# Patient Record
Sex: FEMALE | Race: WHITE | NOT HISPANIC OR LATINO | Employment: FULL TIME | URBAN - NONMETROPOLITAN AREA
[De-identification: names, ages, dates, MRNs, and addresses within clinical notes are randomized per-mention and may not be internally consistent; named-entity substitution may affect disease eponyms.]

---

## 2019-07-07 ENCOUNTER — OCCUPATIONAL MEDICINE (OUTPATIENT)
Dept: URGENT CARE | Facility: PHYSICIAN GROUP | Age: 34
End: 2019-07-07
Payer: COMMERCIAL

## 2019-07-07 ENCOUNTER — APPOINTMENT (OUTPATIENT)
Dept: RADIOLOGY | Facility: IMAGING CENTER | Age: 34
End: 2019-07-07
Attending: PHYSICIAN ASSISTANT
Payer: COMMERCIAL

## 2019-07-07 VITALS
HEART RATE: 84 BPM | DIASTOLIC BLOOD PRESSURE: 84 MMHG | WEIGHT: 225 LBS | RESPIRATION RATE: 16 BRPM | TEMPERATURE: 98.6 F | SYSTOLIC BLOOD PRESSURE: 122 MMHG | OXYGEN SATURATION: 96 %

## 2019-07-07 DIAGNOSIS — S83.002A SUBLUXATION OF LEFT PATELLA, INITIAL ENCOUNTER: ICD-10-CM

## 2019-07-07 DIAGNOSIS — S83.92XA SPRAIN OF LEFT KNEE, UNSPECIFIED LIGAMENT, INITIAL ENCOUNTER: ICD-10-CM

## 2019-07-07 PROCEDURE — 99203 OFFICE O/P NEW LOW 30 MIN: CPT | Mod: 29 | Performed by: PHYSICIAN ASSISTANT

## 2019-07-07 PROCEDURE — 73562 X-RAY EXAM OF KNEE 3: CPT | Mod: TC,FY,LT,29 | Performed by: PHYSICIAN ASSISTANT

## 2019-07-07 NOTE — PROGRESS NOTES
Chief Complaint   Patient presents with   • Work-Related Injury     New  for L knee injury/ DOI: 7/5/19/ [facility]        HISTORY OF PRESENT ILLNESS: Patient is a 33 y.o. female who presents today for the following:    DOI: 7/5/19, around 0700  Patient was on a cart ladder, second step, stepping down, when left knee gave out and fell to the ground. Fell on her back, hitting her head. Denies LOC. Continues to have HA, mid/low back pain, left knee pain/tightness. Decreased ROM left knee. Worsening pain with ambulation. Tingling entire right leg only when supine with left knee elevated. OTC meds tried: ibuprofen, hasn't helped at all (took 800mg this morning, previously was taking it QID); ice, hasn't helped at all. Other places of employment: none. History left knee, back, HA issues: none.      Allergies:Ceclor [cefaclor] and Demerol    PMH: No pertinent past medical history to this problem  MEDS: Medications were reviewed in Epic  ALLERGIES: Allergies were reviewed in Epic  SOCHX: Works as a phoenix at [employer]   FH: No pertinent family history to this problem    Review of Systems:   Constitutional ROS: No unexpected change in weight, No weakness, No fatigue  Musculoskeletal/Extremities ROS: Left knee pain, mid back pain.  Skin/Integumentary ROS: No edema, No evidence of rash, No itching      Exam:  /84   Pulse 84   Temp 37 °C (98.6 °F) (Temporal)   Resp 16   Wt 102.1 kg (225 lb)   SpO2 96%   General: Well developed, well nourished. No distress.  Using a wheelchair.  HEENT: Head is grossly normal.  Pulmonary: Unlabored respiratory effort.   Back: No localized tenderness noted.  No vertebral tenderness noted.  Neurologic: Grossly nonfocal. No facial asymmetry noted.  Extremities: Decreased flexion of the left knee (flexion to approximately 100-120 degrees).  No ecchymosis or deformities noted.  Mild tenderness noted in both joint lines.  No joint laxity noted.  No obvious edema when compared to the right  knee but may be limited due to body habitus.  Left patella is mobile but perhaps more lateral than the right.  Skin: Warm, dry, good turgor. No rashes in visible areas.   Psych: Normal mood. Alert and oriented x3. Judgment and insight is normal.    Left knee x-ray, per radiology:  Impression       1.  Lateral subluxation of the patella.    2.  Small to moderate-sized left knee effusion. No lipohemarthrosis.    3.  No patellar fracture.    4.  No other knee fracture.     Assessment/Plan:  Recommend crutches at work (provided in the clinic).  Refer to D 39 for restrictions.  Discussed appropriate dosing of ibuprofen and acetaminophen as needed for pain.  Recommend Epson salt soaks, ice, and knee brace for compression.  Return to clinic 7/12/2019 for reevaluation, sooner for any significant changes in symptoms.  1. Sprain of left knee, unspecified ligament, initial encounter  DX-KNEE 3 VIEWS LEFT   2. Subluxation of left patella, initial encounter

## 2019-07-07 NOTE — LETTER
EMPLOYEE’S CLAIM FOR COMPENSATION/ REPORT OF INITIAL TREATMENT  FORM C-4    EMPLOYEE’S CLAIM - PROVIDE ALL INFORMATION REQUESTED   First Name  Carlotta Last Name  Jimmie Birthdate                    1985                Sex  female Claim Number   Home Address  [address] Age  33 y.o. 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 (if applicable)  GRABING A BOX DOWN FROM TOP SHELF    How did this injury or occupational disease occur? (Be specific an answer in detail. Use additional sheet if necessary)  I WENT TI CLIMB DOWN THE LADDER AND FELL ON THE GROUND ON MY BACK HIT MY HEAD AND COULDN'T WALK   If you believe that you have an occupational disease, when did you first have knowledge of the disability and it relationship to your employment?  NONE Witnesses to the Accident  NO      Nature of Injury or Occupational Disease  Strain  Part(s) of Body Injured or Affected  Knee (L), Lumbar and/or Sacral Vertebrae (Vertebrae NOC Trunk), Skull    I certify that the above is true and correct to the best of my knowledge and that I have provided this  information in order to obtain the benefits of [place]  I hereby authorize any physician, chiropractor, surgeon, practitioner, or other person, any hospital, including [facility] or [facility], any medical service organization, any insurance company, or other institution or organization to release to each other, any medical or other information, including benefits paid or payable, pertinent to this injury or disease, except information relative to diagnosis, treatment and/or counseling for AIDS, psychological conditions, alcohol or controlled substances, for which I must give specific authorization.  A Photostat of this authorization shall be as valid as the original.     Date   Place [facility]   Employee’s Signature   THIS REPORT MUST BE COMPLETED AND MAILED WITHIN 3 WORKING DAYS OF TREATMENT   Place  [facility]  Name of Facility  [place]   Date  7/7/2019 Diagnosis  (S83.92XA) Sprain of left knee, unspecified ligament, initial encounter  (S83.002A) Subluxation of left patella, initial encounter Is there evidence the injured employee was under the influence of alcohol and/or another controlled substance at the time of accident?   Hour  1:06 PM Description of Injury or Disease  Diagnoses of Sprain of left knee, unspecified ligament, initial encounter and Subluxation of left patella, initial encounter were pertinent to this visit. No   Treatment  Assessment/Plan:  Recommend crutches at work.  Refer to D 39 for restrictions.  Discussed appropriate dosing of ibuprofen and acetaminophen as needed for pain.  Recommend Epson salt soaks, ice, and knee brace for compression.  Return to clinic 7/12/2019 for reevaluation, sooner for any significant changes in symptoms.  1. Sprain of left knee, unspecified ligament, initial encounter  DX-KNEE 3 VIEWS LEFT       Have you  "advised the patient to remain off work five days or more? No   X-Ray Findings  Negative   If Yes   From Date  To Date      From information given by the employee, together with medical evidence, can you directly connect this injury or occupational disease as job incurred?  Yes If No Full Duty  No Modified Duty  Yes   Is additional medical care by a physician indicated?  Yes If Modified Duty, Specify any Limitations / Restrictions  See D39   Do you know of any previous injury or disease contributing to this condition or occupational disease?                            No   Date  7/7/2019 Print Doctor’s Name Zoila Gu P.A.-C. I certify the employer’s copy of  this form was mailed on:   Address  [address] Insurer’s Use Only     [place]  [address]    Provider’s Tax ID Number  [id number] Telephone  Dept: [phone]            e-Signature: Dr. Isaiah Ferro, Medical Director Degree  PAC        ORIGINAL-TREATING PHYSICIAN OR CHIROPRACTOR    PAGE 2-INSURER/TPA    PAGE 3-EMPLOYER    PAGE 4-EMPLOYEE             Form C-4 (rev10/07)              BRIEF DESCRIPTION OF RIGHTS AND BENEFITS  (Pursuant to [place] 616C.050)    Notice of Injury or Occupational Disease (Incident Report Form C-1): If an injury or occupational disease (OD) arises out of and in the  course of employment, you must provide written notice to your employer as soon as practicable, but no later than 7 days after the accident or  OD. Your employer shall maintain a sufficient supply of the required forms.    Claim for Compensation (Form C-4): If medical treatment is sought, the form C-4 is available at the place of initial treatment. A completed  \"Claim for Compensation\" (Form C-4) must be filed within 90 days after an accident or OD. The treating physician or chiropractor must,  within 3 working days after treatment, complete and mail to the employer, the employer's insurer and third-party , the Claim " for  Compensation.    Medical Treatment: If you require medical treatment for your on-the-job injury or OD, you may be required to select a physician or  chiropractor from a list provided by your workers’ compensation insurer, if it has contracted with an Organization for Managed Care (MCO) or  Preferred Provider Organization (PPO) or providers of health care. If your employer has not entered into a contract with an MCO or PPO, you  may select a physician or chiropractor from the Panel of Physicians and Chiropractors. Any medical costs related to your industrial injury or  OD will be paid by your insurer.    Temporary Total Disability (TTD): If your doctor has certified that you are unable to work for a period of at least 5 consecutive days, or 5  cumulative days in a 20-day period, or places restrictions on you that your employer does not accommodate, you may be entitled to TTD  compensation.    Temporary Partial Disability (TPD): If the wage you receive upon reemployment is less than the compensation for TTD to which you are  entitled, the insurer may be required to pay you TPD compensation to make up the difference. TPD can only be paid for a maximum of 24  months.    Permanent Partial Disability (PPD): When your medical condition is stable and there is an indication of a PPD as a result of your injury or  OD, within 30 days, your insurer must arrange for an evaluation by a rating physician or chiropractor to determine the degree of your PPD. The  amount of your PPD award depends on the date of injury, the results of the PPD evaluation and your age and wage.    Permanent Total Disability (PTD): If you are medically certified by a treating physician or chiropractor as permanently and totally disabled  and have been granted a PTD status by your insurer, you are entitled to receive monthly benefits not to exceed 66 2/3% of your average  monthly wage. The amount of your PTD payments is subject to reduction if you  previously received a PPD award.    Vocational Rehabilitation Services: You may be eligible for vocational rehabilitation services if you are unable to return to the job due to a  permanent physical impairment or permanent restrictions as a result of your injury or occupational disease.    Transportation and Per Guillermo Reimbursement: You may be eligible for travel expenses and per guillermo associated with medical treatment.    Reopening: You may be able to reopen your claim if your condition worsens after claim closure.    Appeal Process: If you disagree with a written determination issued by the insurer or the insurer does not respond to your request, you may  appeal to the Department of Administration, , by following the instructions contained in your determination letter. You must  appeal the determination within 70 days from the date of the determination letter at [address], [address], [place], [place]  [address], or [address], [address], [place], [place] [address]. If you disagree with the  decision, you may appeal to the  Department of Administration, . You must file your appeal within 30 days from the date of the  decision  letter at [address], [address], [place], [place] [address], or [address], [address], [place], [place] [address]. If you  disagree with a decision of an , you may file a petition for judicial review with the District Court. You must do so within 30  days of the Appeal Officer’s decision. You may be represented by an  at your own expense or you may contact the [facility] for possible  representation.    [place]  for Injured Workers (NAIW): If you disagree with a  decision, you may request that NAIW represent you  without charge at an  Hearing. For information regarding denial of benefits, you may contact the [facility] at: [address],  [address], [place] [address], [phone], or [address], [address], [place] [address], [phone]    To File a Complaint with the Division: If you wish to file a complaint with the  of the Division of Industrial Relations (DIR),  please contact the Workers’ Compensation Section, [address], [address], [place], [place] [address], telephone [phone]) [phone], or  [address], [address], [place], [place] [address], telephone [phone].    For assistance with Workers’ Compensation Issues: you may contact the Office of the Governor Consumer Health Assistance, [address] [place] [address], [address], [place], [place] [address], Toll Free 1-390.486.3014, Web site: http://NOMERMAIL.RU.[facility].[email or web address]., E-mail  [email or web address].                                                                                                                                                                                                                                   __________________________________________________________________                                                                   _________________                Employee Name / Signature                                                                                                                                                       Date                                                                                                                                                                                                     D-2 (rev. 10/07)

## 2019-07-07 NOTE — LETTER
[facility]  [address] [place] [address] [place] [address]  Phone:  [phone] - Fax:  [phone]   [facility] Progress Report and Disability Certification  Date of Service: 7/7/2019   No Show:  No  Date / Time of Next Visit: 7/12/2019 @ 9:30am   Claim Information   Patient Name: Carlotta Samuel  Claim Number:     Employer: [employer]  Date of Injury: 7/5/2019     Insurer / TPA: [employer]  ID / SSN:     Occupation: CAP 1   Diagnosis: Diagnoses of Sprain of left knee, unspecified ligament, initial encounter and Subluxation of left patella, initial encounter were pertinent to this visit.    Medical Information   Related to Industrial Injury? Yes    Subjective Complaints:  DOI: 7/5/19, around 0700  Patient was on a cart ladder, second step, stepping down, when left knee gave out and fell to the ground. Fell on her back, hitting her head. Denies LOC. Continues to have HA, mid/low back pain, left knee pain/tightness. Decreased ROM left knee. Worsening pain with ambulation. Tingling entire right leg only when supine with left knee elevated. OTC meds tried: ibuprofen, hasn't helped at all (took 800mg this morning, previously was taking it QID); ice, hasn't helped at all. Other places of employment: none. History left knee, back, HA issues: none.     Objective Findings: Back: No localized tenderness noted.  No vertebral tenderness noted.  Extremities: Decreased flexion of the left knee (flexion to approximately 100-120 degrees).  No ecchymosis or deformities noted.  Mild tenderness noted in both joint lines.  No joint laxity noted.  No obvious edema when compared to the right knee but may be limited due to body habitus.  Left patella is mobile but perhaps more lateral than the right.   Pre-Existing Condition(s):     Assessment:   Initial Visit    Status: Additional Care Required  Permanent Disability:No    Plan: Medication  Comments:OTC only    Diagnostics:  X-ray  Comments:left knee: Lateral subluxation of the patella    Comments:  Assessment/Plan:  Recommend crutches at work.  Refer to D 39 for restrictions.  Discussed appropriate dosing of ibuprofen and acetaminophen as needed for pain.  Recommend Epson salt soaks, ice, and knee brace for compression.  Return to clinic   019 for reevaluation, sooner for any significant changes in symptoms.  1. Sprain of left knee, unspecified ligament, initial encounter  DX-KNEE 3 VIEWS LEFT         Disability Information   Status: Released to Restricted Duty    From:  2019  Through: 2019 Restrictions are: Temporary   Physical Restrictions   Sitting:    Standing:  < or = to 2 hrs/day Stoopin hrs/day Bendin hrs/day   Squattin hrs/day Walking:  < or = to 2 hrs/day Climbin hrs/day Pushing:      Pulling:    Other:    Reaching Above Shoulder (L):   Reaching Above Shoulder (R):       Reaching Below Shoulder (L):    Reaching Below Shoulder (R):      Not to exceed Weight Limits   Carrying(hrs):   Weight Limit(lb):   Lifting(hrs):   Weight  Limit(lb):     Comments: No pushing, pulling, lifting, or carrying more than 10 pounds.  Must use crutches and remain nonweightbearing on the left leg per    Repetitive Actions   Hands: i.e. Fine Manipulations from Grasping:     Feet: i.e. Operating Foot Controls:     Driving / Operate Machinery:     Physician Name: Zoila Gu P.A.-C. Physician Signature:   e-Signature: Dr. Isaiah Ferro, Medical Director   Clinic Name / Location: [address] [address] Clinic Phone Number: Dept: [phone]   Appointment Time: 12:45 Pm Visit Start Time: 1:06 PM   Check-In Time:  12:57 Pm Visit Discharge Time: 2:39 PM   Original-Treating Physician or Chiropractor    Page 2-Insurer/TPA    Page 3-Employer    Page 4-Employee

## 2019-07-12 ENCOUNTER — OCCUPATIONAL MEDICINE (OUTPATIENT)
Dept: URGENT CARE | Facility: PHYSICIAN GROUP | Age: 34
End: 2019-07-12
Payer: COMMERCIAL

## 2019-07-12 VITALS
SYSTOLIC BLOOD PRESSURE: 136 MMHG | DIASTOLIC BLOOD PRESSURE: 74 MMHG | TEMPERATURE: 98.6 F | OXYGEN SATURATION: 96 % | RESPIRATION RATE: 16 BRPM | HEART RATE: 54 BPM

## 2019-07-12 DIAGNOSIS — S83.012D LATERAL SUBLUXATION OF LEFT PATELLA, SUBSEQUENT ENCOUNTER: ICD-10-CM

## 2019-07-12 DIAGNOSIS — S83.92XD SPRAIN OF LEFT KNEE, UNSPECIFIED LIGAMENT, SUBSEQUENT ENCOUNTER: ICD-10-CM

## 2019-07-12 PROCEDURE — 99213 OFFICE O/P EST LOW 20 MIN: CPT | Mod: 29 | Performed by: PHYSICIAN ASSISTANT

## 2019-07-12 ASSESSMENT — ENCOUNTER SYMPTOMS
FOCAL WEAKNESS: 0
CONSTITUTIONAL NEGATIVE: 1
SENSORY CHANGE: 0
CARDIOVASCULAR NEGATIVE: 1
RESPIRATORY NEGATIVE: 1

## 2019-07-12 NOTE — LETTER
"   [facility]  [address] [place] [address] [place] [address]  Phone:  [phone] - Fax:  [phone]   [facility] Progress Report and Disability Certification  Date of Service: 2019   No Show:  No  Date / Time of Next Visit: 2019   Claim Information   Patient Name: Carlotta Samuel  Claim Number:     Employer: [employer]  Date of Injury: 2019     Insurer / TPA: [employer]  ID / SSN:     Occupation: CAP 1   Diagnosis: Diagnoses of Sprain of left knee, unspecified ligament, subsequent encounter and Lateral subluxation of left patella, subsequent encounter were pertinent to this visit.    Medical Information   Related to Industrial Injury? Yes    Subjective Complaints:  DOI: 19, around 0700  \"Patient was on a cart ladder, second step, stepping down, when left knee gave out and fell to the ground. Fell on her back, hitting her head. Denies LOC\"  Patient had x rays that showed left knee effusion.     Patient reports today she has slight improvement but not significant. Her ROM has shown some improvement and symptoms are ultimately better with the brace than without.  She has not been back to work and cannot report on tolerance of light duty.     Objective Findings: Vitals reviewed  There is mild appreciable anterior swelling of left knee as compared to the right.   There is mild medial TTP.   Full extension without pain, reduced flexion.   Distal CMS WNL.  Patient in wheelchair as she notes walking is too painful.    Pre-Existing Condition(s):     Assessment:   Condition Improved    Status: Additional Care Required  Permanent Disability:No    Plan:      Diagnostics:      Comments:       Disability Information   Status: Released to Restricted Duty    From:  2019  Through: 2019 Restrictions are: Temporary   Physical Restrictions   Sitting:    Standing:  < or = to 1 hr/day Stooping:    Bending:      Squattin hrs/day Walking:  < or " = to 1 hr/day Climbin hrs/day Pushing:      Pulling:    Other:    Reaching Above Shoulder (L):   Reaching Above Shoulder (R):       Reaching Below Shoulder (L):    Reaching Below Shoulder (R):      Not to exceed Weight Limits   Carrying(hrs):   Weight Limit(lb):   Lifting(hrs):   Weight  Limit(lb):     Comments: Restrictions as above, no climbing/squatting.   Recommend seated position primarily if available.   Advise to wear brace, continue NSAID with food, ice  Consider PT and care transfer if no marked improvement at next visit and patient prefers this plan    Repetitive Actions   Hands: i.e. Fine Manipulations from Grasping:     Feet: i.e. Operating Foot Controls:     Driving / Operate Machinery:     Physician Name: Christina Griffin P.A.-C. Physician Signature:   e-Signature: Dr. Isaiah Ferro, Medical Director   Clinic Name / Location: [address] [address] Clinic Phone Number: Dept: [phone]   Appointment Time: 9:30 Am Visit Start Time: 9:51 AM   Check-In Time:  9:32 Am Visit Discharge Time:  10:30 AM   Original-Treating Physician or Chiropractor    Page 2-Insurer/TPA    Page 3-Employer    Page 4-Employee

## 2019-07-12 NOTE — PROGRESS NOTES
"Subjective:      Carlotta Samuel is a 33 y.o. female who presents with Follow-Up ( fv-feeling slightly better, still having pain )      DOI: 7/5/19, around 0700  \"Patient was on a cart ladder, second step, stepping down, when left knee gave out and fell to the ground. Fell on her back, hitting her head. Denies LOC\"  Patient had x rays that showed left knee effusion.     Patient reports today she has slight improvement but not significant. Her ROM has shown some improvement and symptoms are ultimately better with the brace than without.  She has not been back to work and cannot report on tolerance of light duty.       HPI   As above.     Review of Systems   Constitutional: Negative.    Respiratory: Negative.    Cardiovascular: Negative.    Musculoskeletal:        SEE HPI   Skin: Negative.    Neurological: Negative for sensory change and focal weakness.       PMH, FH and SH reviewed and non contributory.      Objective:     /74   Pulse (!) 54   Temp 37 °C (98.6 °F)   Resp 16   SpO2 96%      Physical Exam   Constitutional: She is oriented to person, place, and time. She appears well-developed and well-nourished. No distress.   HENT:   Head: Normocephalic and atraumatic.   Neck: Normal range of motion. Neck supple.   Cardiovascular: Normal rate.    Pulmonary/Chest: Effort normal.   Neurological: She is alert and oriented to person, place, and time.   Skin: Skin is warm and dry.   Psychiatric: She has a normal mood and affect. Her behavior is normal.     Vitals reviewed  There is mild appreciable anterior swelling of left knee as compared to the right.   There is mild medial TTP.   Full extension without pain, reduced flexion.   Distal CMS WNL.  Patient in wheelchair as she notes walking is too painful.        Assessment/Plan:     1. Sprain of left knee, unspecified ligament, subsequent encounter     2. Lateral subluxation of left patella, subsequent encounter         Restrictions as above, no climbing/squatting. "   Recommend seated position primarily if available.   Advise to wear brace, continue NSAID with food, ice  Consider PT and care transfer if no marked improvement at next visit and patient prefers this plan      Christina Griffin P.A.-C.

## 2019-07-19 ENCOUNTER — OCCUPATIONAL MEDICINE (OUTPATIENT)
Dept: URGENT CARE | Facility: PHYSICIAN GROUP | Age: 34
End: 2019-07-19
Payer: COMMERCIAL

## 2019-07-19 VITALS
RESPIRATION RATE: 14 BRPM | SYSTOLIC BLOOD PRESSURE: 148 MMHG | HEART RATE: 74 BPM | WEIGHT: 224 LBS | OXYGEN SATURATION: 97 % | BODY MASS INDEX: 43.98 KG/M2 | TEMPERATURE: 97 F | HEIGHT: 60 IN | DIASTOLIC BLOOD PRESSURE: 92 MMHG

## 2019-07-19 DIAGNOSIS — S83.92XA SPRAIN OF LEFT KNEE, UNSPECIFIED LIGAMENT, INITIAL ENCOUNTER: ICD-10-CM

## 2019-07-19 DIAGNOSIS — S83.002D SUBLUXATION OF LEFT PATELLA, SUBSEQUENT ENCOUNTER: ICD-10-CM

## 2019-07-19 PROCEDURE — 99213 OFFICE O/P EST LOW 20 MIN: CPT | Mod: 29 | Performed by: PHYSICIAN ASSISTANT

## 2019-07-19 ASSESSMENT — ENCOUNTER SYMPTOMS
SHORTNESS OF BREATH: 0
VOMITING: 0
FEVER: 0
ABDOMINAL PAIN: 0
CHILLS: 0
DIARRHEA: 0
DIZZINESS: 0
MUSCULOSKELETAL NEGATIVE: 1

## 2019-07-19 NOTE — PROGRESS NOTES
"Subjective:      Carlotta Samuel is a 33 y.o. female who presents with Knee Injury ([facility], Pt states she is not improving)      DOI: 7/5/19, around 0700  \"Patient was on a cart ladder, second step, stepping down, when left knee gave out and fell to the ground. Fell on her back, hitting her head. Denies LOC\"  Patient had x rays that showed left knee effusion.     Patient returns today for follow up, visit #3 and reports no improvement in the pain.She is still having difficulty bending the knee because it feels \"tight\".  She has been elevating the knee and taking OTC nsaids without significant relief of pain.     Knee Injury   Pertinent negatives include no abdominal pain, chest pain, chills, congestion, fever, rash or vomiting.       Review of Systems   Constitutional: Negative for chills and fever.   HENT: Negative for congestion.    Respiratory: Negative for shortness of breath.    Cardiovascular: Negative for chest pain.   Gastrointestinal: Negative for abdominal pain, diarrhea and vomiting.   Genitourinary: Negative.    Musculoskeletal: Negative.         + knee pain   Skin: Negative for rash.   Neurological: Negative for dizziness.          Objective:     /92   Pulse 74   Temp 36.1 °C (97 °F) (Temporal)   Resp 14   Ht 1.524 m (5')   Wt 101.6 kg (224 lb)   SpO2 97%   BMI 43.75 kg/m²      Physical Exam   Constitutional: She is oriented to person, place, and time. She appears well-developed and well-nourished. No distress.   HENT:   Head: Normocephalic and atraumatic.   Eyes: Pupils are equal, round, and reactive to light. Conjunctivae are normal.   Neck: Normal range of motion.   Cardiovascular: Normal rate.    Pulmonary/Chest: Effort normal.   Musculoskeletal: Normal range of motion.        Left knee: She exhibits normal range of motion, no swelling, no effusion, no LCL laxity and no MCL laxity. Tenderness found. No medial joint line, no lateral joint line, no MCL and no LCL tenderness noted.   No " erythema, edema, or ecchymosis of left knee. + TTP over anterior aspect of knee.    Neurological: She is alert and oriented to person, place, and time.   Skin: Skin is warm and dry. She is not diaphoretic.   Psychiatric: She has a normal mood and affect. Her behavior is normal.   Nursing note and vitals reviewed.         PMH:  has no past medical history on file.  MEDS:   Current Outpatient Prescriptions:   •  Escitalopram Oxalate (LEXAPRO PO), Take  by mouth., Disp: , Rfl:   ALLERGIES:   Allergies   Allergen Reactions   • Ceclor [Cefaclor] Unspecified     Pt was told when she was a kid   • Demerol Unspecified     shaking     SURGHX: No past surgical history on file.  SOCHX:  reports that she has never smoked. She has never used smokeless tobacco. She reports that she does not drink alcohol or use drugs.  FH: family history is not on file.       Assessment/Plan:     1. Sprain of left knee, unspecified ligament, initial encounter    2. Subluxation of left patella, subsequent encounter    Continue wearing knee brace daily  Icing 2-3 times daily  Elevation at night  OTC nsaids every 6-8 hours as needed for pain, take with food  RTC in 1 week for follow up

## 2019-07-19 NOTE — LETTER
"   [facility]  [address] [place] [address] [place] [address]  Phone:  [phone] - Fax:  [phone]   [facility] Progress Report and Disability Certification  Date of Service: 7/19/2019   No Show:  No  Date / Time of Next Visit: 7/26/2019   Claim Information   Patient Name: Carlotta Samuel  Claim Number:     Employer: [employer]  Date of Injury: 7/5/2019     Insurer / TPA: [employer]  ID / SSN:     Occupation: [employer] 1   Diagnosis: Diagnoses of Sprain of left knee, unspecified ligament, initial encounter and Subluxation of left patella, subsequent encounter were pertinent to this visit.    Medical Information   Related to Industrial Injury? Yes    Subjective Complaints:  DOI: 7/5/19, around 0700  \"Patient was on a cart ladder, second step, stepping down, when left knee gave out and fell to the ground. Fell on her back, hitting her head. Denies LOC\"  Patient had x rays that showed left knee effusion.     Patient returns today for follow up, visit #3 and reports no improvement in the pain.She is still having difficulty bending the knee because it feels \"tight\".  She has been elevating the knee and taking OTC nsaids without significant relief of pain.   Objective Findings: Musculoskeletal: Normal range of motion.        Left knee: She exhibits normal range of motion, no swelling, no effusion, no LCL laxity and no MCL laxity. Tenderness found. No medial joint line, no lateral joint line, no MCL and no LCL tenderness noted.   No erythema, edema, or ecchymosis of left knee. + TTP over anterior aspect of knee.     Pre-Existing Condition(s):     Assessment:   Condition Same    Status: Additional Care Required  Permanent Disability:No    Plan:      Diagnostics: X-ray  Comments:Patellar subluxation, mild effusion    Comments:       Disability Information   Status: Released to Restricted Duty    From:  7/12/2019  Through: 7/26/2019 Restrictions are:     Physical " Restrictions   Sitting:    Standing:  < or = to 1 hr/day Stooping:    Bending:      Squattin hrs/day Walking:  < or = to 1 hr/day Climbin hrs/day Pushing:      Pulling:    Other:    Reaching Above Shoulder (L):   Reaching Above Shoulder (R):       Reaching Below Shoulder (L):    Reaching Below Shoulder (R):      Not to exceed Weight Limits   Carrying(hrs):   Weight Limit(lb): < or = to 10 pounds Lifting(hrs):   Weight  Limit(lb): < or = to 10 pounds   Comments: Continue wearing knee brace daily  Icing 2-3 times daily  Elevation at night  OTC nsaids every 6-8 hours as needed for pain, take with food  RTC in 1 week for follow up    Repetitive Actions   Hands: i.e. Fine Manipulations from Grasping:     Feet: i.e. Operating Foot Controls:     Driving / Operate Machinery:     Physician Name: Niya Vasquez P.A.-C. Physician Signature: NIYA Aiken P.A.-C. e-Signature: Dr. Isaiah Ferro, Medical Director   Clinic Name / Location: [address] [address] Clinic Phone Number: Dept: [phone]   Appointment Time: 3:00 Pm Visit Start Time: 3:03 PM   Check-In Time:  2:59 Pm Visit Discharge Time:  4:02 PM   Original-Treating Physician or Chiropractor    Page 2-Insurer/TPA    Page 3-Employer    Page 4-Employee

## 2019-07-26 ENCOUNTER — OCCUPATIONAL MEDICINE (OUTPATIENT)
Dept: URGENT CARE | Facility: PHYSICIAN GROUP | Age: 34
End: 2019-07-26
Payer: COMMERCIAL

## 2019-07-26 VITALS
RESPIRATION RATE: 14 BRPM | OXYGEN SATURATION: 92 % | HEIGHT: 60 IN | TEMPERATURE: 97.9 F | DIASTOLIC BLOOD PRESSURE: 84 MMHG | HEART RATE: 63 BPM | BODY MASS INDEX: 44.17 KG/M2 | SYSTOLIC BLOOD PRESSURE: 120 MMHG | WEIGHT: 225 LBS

## 2019-07-26 DIAGNOSIS — M62.830 MUSCLE SPASM OF BACK: ICD-10-CM

## 2019-07-26 DIAGNOSIS — M62.838 NECK MUSCLE SPASM: ICD-10-CM

## 2019-07-26 DIAGNOSIS — S83.012D LATERAL SUBLUXATION OF LEFT PATELLA, SUBSEQUENT ENCOUNTER: ICD-10-CM

## 2019-07-26 DIAGNOSIS — S83.92XD SPRAIN OF LEFT KNEE, UNSPECIFIED LIGAMENT, SUBSEQUENT ENCOUNTER: ICD-10-CM

## 2019-07-26 PROCEDURE — 99214 OFFICE O/P EST MOD 30 MIN: CPT | Mod: 29 | Performed by: NURSE PRACTITIONER

## 2019-07-26 RX ORDER — CYCLOBENZAPRINE HCL 5 MG
5 TABLET ORAL 3 TIMES DAILY PRN
Qty: 30 TAB | Refills: 0 | Status: SHIPPED | OUTPATIENT
Start: 2019-07-26 | End: 2019-09-17

## 2019-07-26 ASSESSMENT — ENCOUNTER SYMPTOMS
SENSORY CHANGE: 0
FALLS: 0
HEADACHES: 1
MYALGIAS: 1
WEAKNESS: 0
FEVER: 0
BRUISES/BLEEDS EASILY: 0
NECK PAIN: 1
BACK PAIN: 1
CHILLS: 0
DIZZINESS: 0
TINGLING: 0

## 2019-07-26 ASSESSMENT — PAIN SCALES - GENERAL: PAINLEVEL: 3=SLIGHT PAIN

## 2019-07-26 NOTE — PROGRESS NOTES
Subjective:      Carlotta Samuel is a 33 y.o. female who presents with Knee Injury      DOI 7/5/19. 4th encounter. States left knee has not improved. States still has pain on medial aspect of left knee joint.  has not worked in 1 week due to scheduling issues. States her lower back and neck still fell sore from fall. States will get headaches regularly since fall. Denies dizziness, n/v, confusion. States will get intermittent left foot swelling with sitting and standing.      HPI  See above  PMH:  has no past medical history on file.  MEDS:   Current Outpatient Prescriptions:   •  cyclobenzaprine (FLEXERIL) 5 MG tablet, Take 1 Tab by mouth 3 times a day as needed for Muscle Spasms. Causes drowsiness, do not drive or work while using., Disp: 30 Tab, Rfl: 0  •  Escitalopram Oxalate (LEXAPRO PO), Take  by mouth., Disp: , Rfl:   ALLERGIES:   Allergies   Allergen Reactions   • Ceclor [Cefaclor] Unspecified     Pt was told when she was a kid   • Demerol Unspecified     shaking     SURGHX: History reviewed. No pertinent surgical history.  SOCHX:  reports that she has never smoked. She has never used smokeless tobacco. She reports that she does not drink alcohol or use drugs.  FH: Family history was reviewed, no pertinent findings to report    Review of Systems   Constitutional: Negative for chills, fever and malaise/fatigue.   Cardiovascular: Positive for leg swelling.   Musculoskeletal: Positive for back pain, joint pain, myalgias and neck pain. Negative for falls.   Skin: Negative for itching and rash.   Neurological: Positive for headaches. Negative for dizziness, tingling, sensory change and weakness.   Endo/Heme/Allergies: Does not bruise/bleed easily.   All other systems reviewed and are negative.         Objective:     /84 (BP Location: Left arm, Patient Position: Sitting, BP Cuff Size: Adult long)   Pulse 63   Temp 36.6 °C (97.9 °F) (Temporal)   Resp 14   Ht 1.524 m (5')   Wt 102.1 kg (225 lb)   SpO2  92%   BMI 43.94 kg/m²      Physical Exam   Constitutional: She is oriented to person, place, and time. She appears well-developed and well-nourished. She is active and cooperative.  Non-toxic appearance. She does not have a sickly appearance. She does not appear ill. No distress.   HENT:   Head: Normocephalic.   Eyes: Pupils are equal, round, and reactive to light. EOM are normal.   Neck: Neck supple. Muscular tenderness present. No spinous process tenderness present. No neck rigidity. Decreased range of motion present. No edema and no erythema present.       Cardiovascular: Normal rate.    Pulmonary/Chest: Effort normal. No accessory muscle usage. No respiratory distress.   Musculoskeletal: She exhibits edema and tenderness. She exhibits no deformity.        Cervical back: She exhibits decreased range of motion, tenderness and spasm. She exhibits no bony tenderness, no swelling, no edema, no deformity and no pain.        Lumbar back: She exhibits pain and spasm. She exhibits normal range of motion, no tenderness, no bony tenderness, no swelling, no edema and no deformity.   Neurological: She is alert and oriented to person, place, and time.   Skin: Skin is warm and dry. No rash noted. She is not diaphoretic. No erythema.   Vitals reviewed.      A/O x 3. Skin p/w/d, skin sensation intact. Left knee TTP at medial aspect. No redness, edema of LLE. Antalgic gait. No drawer movement. Mild swelling to left foot. No redness, swelling or pain to left calf. Mild decreased ROM of neck region with twisting motion due to discomfort along left side of trapezius muscle/base of neck. Mild lumbago with position changes.       Assessment/Plan:     1. Muscle spasm of back    - cyclobenzaprine (FLEXERIL) 5 MG tablet; Take 1 Tab by mouth 3 times a day as needed for Muscle Spasms. Causes drowsiness, do not drive or work while using.  Dispense: 30 Tab; Refill: 0  - REFERRAL TO PHYSICAL THERAPY Reason for Therapy:  Eval/Treat/Report    2. Neck muscle spasm    - REFERRAL TO PHYSICAL THERAPY Reason for Therapy: Eval/Treat/Report    3. Sprain of left knee, unspecified ligament, subsequent encounter    - REFERRAL TO PHYSICAL THERAPY Reason for Therapy: Eval/Treat/Report    4. Lateral subluxation of left patella, subsequent encounter    - REFERRAL TO PHYSICAL THERAPY Reason for Therapy: Eval/Treat/Report    May use Tylenol/Ibuprofen ads needed for pain. May use muscle relaxant only at home. May use ice/heat and IctHot with lidocaine as needed for localized pain relief. Encourage small range of motion exercises for neck and back as tolerated to prevent muscle stiffness. May elevate leg off ground while sitting. Must wear knee brace at work.   Will initiate Physical Therapy at this time and follow up with Occupational Medicine at next visit in 2 weeks.

## 2019-07-26 NOTE — LETTER
[facility] [place] [place] [address]  Phone:  [phone] - Fax:  [phone]   [facility] Progress Report and Disability Certification  Date of Service: 7/26/2019   No Show:  No  Date / Time of Next Visit: 8/9/2019 in Occupational Medicine   Claim Information   Patient Name: Carlotta Samuel  Claim Number:     Employer: [employer]  Date of Injury: 7/5/2019     Insurer / TPA: [employer]  ID / SSN:     Occupation: [employer] 1   Diagnosis: Diagnoses of Muscle spasm of back, Neck muscle spasm, Sprain of left knee, unspecified ligament, subsequent encounter, and Lateral subluxation of left patella, subsequent encounter were pertinent to this visit.    Medical Information   Related to Industrial Injury? Yes    Subjective Complaints:  DOI 7/5/19. 4th encounter. States left knee has not improved. States still has pain on medial aspect of left knee joint.  has not worked in 1 week due to scheduling issues. States her lower back and neck still fell sore from fall. States will get headaches regularly since fall. Denies dizziness, n/v, confusion. States will get intermittent left foot swelling with sitting and standing.    Objective Findings: A/O x 3. Skin p/w/d, skin sensation intact. Left knee TTP at medial aspect. No redness, edema of LLE. Antalgic gait. No drawer movement. Mild swelling to left foot. No redness, swelling or pain to left calf. Mild decreased ROM of neck region with twisting motion due to discomfort along left side of trapezius muscle/base of neck. Mild lumbago with position changes.   Pre-Existing Condition(s):     Assessment:   Condition Same    Status: Additional Care Required  Permanent Disability:No    Plan:      Diagnostics:      Comments:       Disability Information   Status: Released to Restricted Duty    From:  7/26/2019  Through: 8/9/2019 Restrictions are:     Physical Restrictions   Sitting:    Standing:  < or =  to 1 hr/day Stooping:    Bendin hrs/day   Squattin hrs/day Walking:  < or = to 1 hr/day Climbin hrs/day Pushing:      Pulling:    Other:    Reaching Above Shoulder (L):   Reaching Above Shoulder (R):       Reaching Below Shoulder (L):    Reaching Below Shoulder (R):      Not to exceed Weight Limits   Carrying(hrs):   Weight Limit(lb): < or = to 10 pounds Lifting(hrs):   Weight  Limit(lb): < or = to 10 pounds   Comments: May use Tylenol/Ibuprofen ads needed for pain. May use muscle relaxant only at home. May use ice/heat and IctHot with lidocaine as needed for localized pain relief. Encourage small range of motion exercises for neck and back as tolerated to prevent muscle stiffness. May elevate leg off ground while sitting. Must wear knee brace at work. Will initiate Physical Therapy at this time and follow up with Occupational Medicine at next visit in 2 weeks.    Repetitive Actions   Hands: i.e. Fine Manipulations from Grasping:     Feet: i.e. Operating Foot Controls:     Driving / Operate Machinery:     Physician Name: BUCK Chance Physician Signature: ROXANNA Bazan e-Signature: Dr. Isaiah Ferro, Medical Director   Clinic Name / Location: [address] [address] Clinic Phone Number: Dept: [phone]   Appointment Time: 10:00 Am Visit Start Time: 10:09 AM   Check-In Time:  9:46 Am Visit Discharge Time: 11:15 AM   Original-Treating Physician or Chiropractor    Page 2-Insurer/TPA    Page 3-Employer    Page 4-Employee

## 2019-08-13 ENCOUNTER — TELEPHONE (OUTPATIENT)
Dept: OCCUPATIONAL MEDICINE | Facility: CLINIC | Age: 34
End: 2019-08-13

## 2019-08-15 ENCOUNTER — OCCUPATIONAL MEDICINE (OUTPATIENT)
Dept: OCCUPATIONAL MEDICINE | Facility: CLINIC | Age: 34
End: 2019-08-15
Payer: COMMERCIAL

## 2019-08-15 VITALS
WEIGHT: 226 LBS | SYSTOLIC BLOOD PRESSURE: 108 MMHG | DIASTOLIC BLOOD PRESSURE: 68 MMHG | HEIGHT: 60 IN | TEMPERATURE: 98.6 F | BODY MASS INDEX: 44.37 KG/M2 | HEART RATE: 98 BPM | OXYGEN SATURATION: 98 %

## 2019-08-15 DIAGNOSIS — S83.402D SPRAIN OF UNSPECIFIED COLLATERAL LIGAMENT OF LEFT KNEE, SUBSEQUENT ENCOUNTER: ICD-10-CM

## 2019-08-15 DIAGNOSIS — S83.012D: ICD-10-CM

## 2019-08-15 PROCEDURE — 99213 OFFICE O/P EST LOW 20 MIN: CPT | Mod: 29 | Performed by: NURSE PRACTITIONER

## 2019-08-15 ASSESSMENT — ENCOUNTER SYMPTOMS
SEIZURES: 0
MYALGIAS: 1
FOCAL WEAKNESS: 0
RESPIRATORY NEGATIVE: 1
EYES NEGATIVE: 1
DIZZINESS: 0
DOUBLE VISION: 0
CARDIOVASCULAR NEGATIVE: 1
CONSTITUTIONAL NEGATIVE: 1
PHOTOPHOBIA: 0
LOSS OF CONSCIOUSNESS: 0
PSYCHIATRIC NEGATIVE: 1
BLURRED VISION: 0
NECK PAIN: 1
HEADACHES: 1

## 2019-08-15 NOTE — PROGRESS NOTES
"Subjective:      Carlotta Samuel is a 33 y.o. female who presents with Other (WC DOI 7/5/19 left knee, feeling better, room 17)      DOI: 7/5/19: Grabbing a box down from top shelf. I went to climb down the ladder and fell on the ground on my back hit my head and couldn't walk. Pt states that she feels better than she did when this started, but still has pain at the medial aspect of the knee, has limp, and feels like it wants to pop. \"Feels like knee and needs oil.\" Pain is described as stiff. Left calf still hurts with pressure. Pt was taking ibuprofen with some relief and the muscle relaxants which makes her sleepy. Neck feels better. The knee is the biggest complaint. She has to sleep with pillows, so the knees don't touch because it hurts. Pt states that she was having headaches and that this has almost resolved. Pt is starting physical therapy 8/26/19. Pt is not using heat or ice as this time, because nothing is helping. Discussed plan of care.      HPI    Review of Systems   Constitutional: Negative.    Eyes: Negative.  Negative for blurred vision, double vision and photophobia.   Respiratory: Negative.    Cardiovascular: Negative.    Musculoskeletal: Positive for joint pain, myalgias and neck pain.   Skin: Negative.    Neurological: Positive for headaches. Negative for dizziness, focal weakness, seizures and loss of consciousness.   Psychiatric/Behavioral: Negative.         ROS: All systems were reviewed on intake form, form was reviewed and signed. See scanned documents in media. Pertinent positives and negatives included in HPI.    PMH: No pertinent past medical history to this problem  MEDS: Medications were reviewed in Epic  ALLERGIES:   Allergies   Allergen Reactions   • Ceclor [Cefaclor] Unspecified     Pt was told when she was a kid   • Demerol Unspecified     shaking     SOCHX: Works as Associate at [employer]  FH: No pertinent family history to this problem       Objective:     /68   Pulse 98   Temp " 37 °C (98.6 °F)   Ht 1.524 m (5')   Wt 102.5 kg (226 lb)   SpO2 98%   BMI 44.14 kg/m²      Physical Exam   Constitutional: She is oriented to person, place, and time. She appears well-developed and well-nourished.   Eyes: Pupils are equal, round, and reactive to light. Conjunctivae and EOM are normal.   Neck: Neck supple. Muscular tenderness present. No neck rigidity. Decreased range of motion present. No edema and no erythema present.       Cardiovascular: Normal rate and regular rhythm.   Pulmonary/Chest: Effort normal.   Musculoskeletal: She exhibits edema and tenderness. She exhibits no deformity.        Left knee: She exhibits decreased range of motion, effusion, abnormal alignment, bony tenderness and MCL laxity. She exhibits no swelling and no erythema. Tenderness found. Medial joint line tenderness noted.        Left lower leg: She exhibits tenderness and swelling. She exhibits no bony tenderness and no edema.        Legs:  Neurological: She is alert and oriented to person, place, and time.   Skin: Skin is warm and dry. Capillary refill takes less than 2 seconds.   Psychiatric: She has a normal mood and affect. Her behavior is normal.       Left knee:  Tender to touch and palpation at medial aspect   No erythema, ecchymosis,  warmth, or edema of LLE  Can wiggle toes without difficulty  Brisk cap refill noted     Antalgic gait   Neg drawer test   Mild edema to left foot  Neg erythema, edema,   Pain in the left calf with pressure    Left calf:  Pos tenderness  Neg edema, ecchymosis, or erythema  Neck:   Mild decreased ROM of neck region with twisting due to continued discomfort along left side of trapezius muscle/base of neck   Mild pain with position changes  No deformities noted    X-ray 7/7/19:  FINDINGS:  There is slight lateral subluxation of the patella.  No patellar fracture is identified.  The remainder of the left knee structures appear normal.  There is a small to moderate-sized joint effusion.   No lipohemarthrosis is present.  There is no evidence of displaced fracture.  There is no focal swelling.  Impression    1.  Lateral subluxation of the patella.  2.  Small to moderate-sized left knee effusion. No lipohemarthrosis.  3.  No patellar fracture.  4.  No other knee fracture.           Assessment/Plan:     1. Sprain of unspecified collateral ligament of left knee, subsequent encounter    2. Lateral subluxation of patella, left, subsequent encounter    - MR-KNEE-W/O LEFT; Future    Follow-up in 3 weeks  MRI ordered  Keep Physical therapy appt 8/26/19   Work restrictions applied  Continue with wearing brace and ace wrap as tolerated  Continue RICE   Continue with heat and ice as tolerated   Continue with Ibuprofen as tolerated

## 2019-08-15 NOTE — LETTER
"   [facility]  [address] [place] [address],   [address] [place] [address]  Phone:  [phone] - Fax:  [phone]   [facility] Progress Report and Disability Certification  Date of Service: 8/15/2019   No Show:  No  Date / Time of Next Visit: 9/5/2019 @ 9:45 am   Claim Information   Patient Name: Carlotta Samuel  Claim Number:     Employer: [employer]  Date of Injury: 7/5/2019     Insurer / TPA: [employer]  ID / SSN:     Occupation: [employer] 1   Diagnosis: Diagnoses of Sprain of unspecified collateral ligament of left knee, subsequent encounter and Lateral subluxation of patella, left, subsequent encounter were pertinent to this visit.    Medical Information   Related to Industrial Injury? Yes    Subjective Complaints:  DOI: 7/5/19: Grabbing a box down from top shelf. I went to climb down the ladder and fell on the ground on my back hit my head and couldn't walk. Pt states that she feels better than she did when this started, but still has pain at the medial aspect of the knee, has limp, and feels like it wants to pop. \"Feels like knee and needs oil.\" Pain is described as stiff. Left calf still hurts with pressure. Pt was taking ibuprofen with some relief and the muscle relaxants which makes her sleepy. Neck feels better. The knee is the biggest complaint. She has to sleep with pillows, so the knees don't touch because it hurts. Pt states that she was having headaches and that this has almost resolved. Pt is starting physical therapy 8/26/19. Pt is not using heat or ice as this time, because nothing is helping. Discussed plan of care.    Objective Findings: Left knee:  Tender to touch and palpation at medial aspect   No erythema, ecchymosis, or edema of LLE   Antalgic gait   Neg drawer test   Mild edema to left foot  Neg erythema, edema,   Pain in the left calf with pressure    Neck:   Mild decreased ROM of neck region with twisting due to continued discomfort " along left side of trapezius muscle/base of neck   Mild pain with position changes  No deformities noted    X-ray 19:  FINDINGS:  There is slight lateral subluxation of the patella.  No patellar fracture is identified.  The remainder of the left knee structures appear normal.  There is a small to moderate-sized joint effusion.  No lipohemarthrosis is present.  There is no evidence of displaced fracture.  There is no focal swelling.  Impression    1.  Lateral subluxation of the patella.  2.  Small to moderate-sized left knee effusion. No lipohemarthrosis.  3.  No patellar fracture.  4.  No other knee fracture.       Pre-Existing Condition(s):     Assessment:   Condition Same    Status: Additional Care Required  Permanent Disability:No    Plan:      Diagnostics: MRI    Comments:  Follow-up in 3 weeks  MRI ordered  Keep Physical therapy appt 19   Work restrictions applied  Continue with wearing brace and ace wrap as tolerated  Continue RICE   Continue with heat and ice as tolerated   Continue with Ibuprofen as tolerated    Disability Information   Status: Released to Restricted Duty    From:  8/15/2019  Through: 2019 Restrictions are: Temporary   Physical Restrictions   Sitting:    Standing:  < or = to 1 hr/day Stooping:    Bending:  < or = to 1 hr/day   Squatting:    Walking:  < or = to 1 hr/day Climbin hrs/day Pushing:      Pulling:    Other:    Reaching Above Shoulder (L):   Reaching Above Shoulder (R):       Reaching Below Shoulder (L):    Reaching Below Shoulder (R):      Not to exceed Weight Limits   Carrying(hrs):   Weight Limit(lb): < or = to 10 pounds Lifting(hrs):   Weight  Limit(lb): < or = to 10 pounds   Comments:      Repetitive Actions   Hands: i.e. Fine Manipulations from Grasping:     Feet: i.e. Operating Foot Controls:     Driving / Operate Machinery:     Physician Name: GIAN Andersen Physician Signature: KAI Shaffer e-Signature: Dr. Isaiah Ferro,  Medical Director   Clinic Name / Location: [address],   [address]  [place] [address] Clinic Phone Number: Dept: [phone]   Appointment Time: 3:45 Pm Visit Start Time: 3:39 PM   Check-In Time:  3:25 Pm Visit Discharge Time: 4:12 pm   Original-Treating Physician or Chiropractor    Page 2-Insurer/TPA    Page 3-Employer    Page 4-Employee

## 2019-09-04 ENCOUNTER — TELEPHONE (OUTPATIENT)
Dept: OCCUPATIONAL MEDICINE | Facility: CLINIC | Age: 34
End: 2019-09-04

## 2019-09-09 ENCOUNTER — TELEPHONE (OUTPATIENT)
Dept: OCCUPATIONAL MEDICINE | Facility: CLINIC | Age: 34
End: 2019-09-09

## 2019-09-10 ENCOUNTER — OCCUPATIONAL MEDICINE (OUTPATIENT)
Dept: OCCUPATIONAL MEDICINE | Facility: CLINIC | Age: 34
End: 2019-09-10
Payer: COMMERCIAL

## 2019-09-10 VITALS
HEIGHT: 60 IN | BODY MASS INDEX: 44.37 KG/M2 | SYSTOLIC BLOOD PRESSURE: 102 MMHG | DIASTOLIC BLOOD PRESSURE: 70 MMHG | WEIGHT: 226 LBS | OXYGEN SATURATION: 95 % | TEMPERATURE: 97.6 F | RESPIRATION RATE: 16 BRPM | HEART RATE: 72 BPM

## 2019-09-10 DIAGNOSIS — S83.402D SPRAIN OF UNSPECIFIED COLLATERAL LIGAMENT OF LEFT KNEE, SUBSEQUENT ENCOUNTER: ICD-10-CM

## 2019-09-10 DIAGNOSIS — S83.012D: ICD-10-CM

## 2019-09-10 PROCEDURE — 99213 OFFICE O/P EST LOW 20 MIN: CPT | Mod: 29 | Performed by: NURSE PRACTITIONER

## 2019-09-10 ASSESSMENT — ENCOUNTER SYMPTOMS
CARDIOVASCULAR NEGATIVE: 1
CONSTITUTIONAL NEGATIVE: 1
PSYCHIATRIC NEGATIVE: 1
RESPIRATORY NEGATIVE: 1
MYALGIAS: 1
NEUROLOGICAL NEGATIVE: 1

## 2019-09-10 NOTE — LETTER
[address],   [address] [address]  Phone:  [phone] - Fax:  [phone]   [facility] Progress Report and Disability Certification  Date of Service: 9/10/2019   No Show:  No  Date / Time of Next Visit: 9/24/2019/ Discharged/Transfer Care Orthopedics   Claim Information   Patient Name: Carlotta Samuel  Claim Number:     Employer: [employer]  Date of Injury: 7/5/2019     Insurer / TPA: [employer]  ID / SSN:     Occupation: [employer] 1   Diagnosis: Diagnoses of Sprain of unspecified collateral ligament of left knee, subsequent encounter and Lateral subluxation of patella, left, subsequent encounter were pertinent to this visit.    Medical Information   Related to Industrial Injury? Yes    Subjective Complaints:  DOI: 7/5/19: Grabbing a box down from top shelf. I went to climb down the ladder and fell on the ground on my back hit my head and couldn't walk. Pt states that she feels better than she did when this started, but still has pain at the medial aspect of the knee, has limp, and feels like it wants to pop.  MRI results reviewed with patient.  Patient states that he knee is not improved it feels worse. Patient states that the ice has been ineffective. Patient states that she has not been able to walk.  Plan of care discussed with patient.    Objective Findings: Left knee:   Tender to touch and palpation at medial aspect    No erythema, ecchymosis,  warmth, or edema of LLE   Can wiggle toes without difficulty   Brisk cap refill noted      Antalgic gait    Neg drawer test    Mild edema to left foot   Neg erythema, edema,   Pain in the left calf with pressure     Left calf:   Pos tenderness   Neg edema, ecchymosis, or erythema   Neck:    Mild decreased ROM of neck region with twisting due to continued discomfort along left side of trapezius muscle/base of neck    Mild pain with position changes   No deformities noted     X-ray  19:   FINDINGS:   There is slight lateral subluxation of the patella.   No patellar fracture is identified.   The remainder of the left knee structures appear normal.   There is a small to moderate-sized joint effusion.  No lipohemarthrosis is present.   There is no evidence of displaced fracture.   There is no focal swelling.   Impression   1.  Lateral subluxation of the patella.   2.  Small to moderate-sized left knee effusion. No lipohemarthrosis.   3.  No patellar fracture.   4.  No other knee fracture.      Pre-Existing Condition(s):     Assessment:   Condition Worsened    Status: Discharged / Care Transfer  Permanent Disability:No    Plan: Transfer Care    Diagnostics: Other (see comment)    Comments:  Follow-up in 3 weeks, if not seen by Orthopedics  Transfer Orthopedics  Orthopedic referral ordered  Work restrictions, per orthopedics  Continue with wearing brace   Continue RICE and heat as needed      Disability Information   Status: Released to Restricted Duty    From:  9/10/2019  Through: 2019 Restrictions are: Temporary   Physical Restrictions   Sitting:    Standing:  < or = to 1 hr/day Stooping:    Bending:      Squatting:    Walking:  < or = to 1 hr/day Climbin hrs/day Pushing:      Pulling:    Other:    Reaching Above Shoulder (L):   Reaching Above Shoulder (R):       Reaching Below Shoulder (L):    Reaching Below Shoulder (R):      Not to exceed Weight Limits   Carrying(hrs):   Weight Limit(lb): < or = to 10 pounds Lifting(hrs):   Weight  Limit(lb): < or = to 10 pounds   Comments:      Repetitive Actions   Hands: i.e. Fine Manipulations from Grasping:     Feet: i.e. Operating Foot Controls:     Driving / Operate Machinery:     Physician Name: GIAN Andersen Physician Signature:   e-Signature: Dr. Isaiah Ferro, Medical Director   Clinic Name / Location: [address],   [address]  [place] [address] Clinic Phone Number: Dept: [phone]      Appointment Time: 11:15 Am Visit Start Time: 10:43 AM   Check-In Time:  10:41 Am Visit Discharge Time: 11:29am   Original-Treating Physician or Chiropractor    Page 2-Insurer/TPA    Page 3-Employer    Page 4-Employee

## 2019-09-10 NOTE — PROGRESS NOTES
"Subjective:      Carlotta Samuel is a 34 y.o. female who presents with Other ([facility] follow up - Left knee -  Same - Room 18)      DOI: 7/5/19: Grabbing a box down from top shelf. I went to climb down the ladder and fell on the ground on my back hit my head and couldn't walk. Pt states that she feels better than she did when this started, but still has pain at the medial aspect of the knee, has limp, and feels like it wants to pop.  MRI results reviewed with patient.  Patient states that the knee is not improved it feels worse. Patient states that the ice has been ineffective. Patient states that she has not been able to walk. Patient states that the radiologist told her \"Your knee is really messed up and you will need surgery.\" Patient states that she has not been back to [facility] since the injury because she does not want to stand on her knee. Patient states that she also declined attending her intial physical therapy appointment, until she had the MRI completed.  Plan of care discussed with patient.  Patient states that she took another position at [employer], but it has only aggravated her knee. Patient wanted work restrictions for both jobs. Explained that since she states that she was injured at [facility], [facility] is not required to follow the restrictions.     HPI    Review of Systems   Constitutional: Negative.    Respiratory: Negative.    Cardiovascular: Negative.    Musculoskeletal: Positive for joint pain and myalgias.   Skin: Negative.    Neurological: Negative.    Psychiatric/Behavioral: Negative.         SOCHX: Works as a Associate at [employer]  FH: No pertinent family history to this problem.         Objective:     /70   Pulse 72   Temp 36.4 °C (97.6 °F) (Temporal)   Resp 16   Ht 1.524 m (5')   Wt 102.5 kg (226 lb)   SpO2 95%   BMI 44.14 kg/m²      Physical Exam    Left knee:   Tender to touch and palpation at medial aspect    No erythema, ecchymosis,  warmth, or edema of LLE   Can wiggle toes without " difficulty   Brisk cap refill noted      Antalgic gait    Neg drawer test    Mild edema to left foot   Neg erythema, edema,   Pain in the left calf with pressure     Left calf:   Pos tenderness   Neg edema, ecchymosis, or erythema   Neck:    Mild decreased ROM of neck region with twisting due to continued discomfort along left side of trapezius muscle/base of neck    Mild pain with position changes   No deformities noted     X-ray 7/7/19:   FINDINGS:   There is slight lateral subluxation of the patella.   No patellar fracture is identified.   The remainder of the left knee structures appear normal.   There is a small to moderate-sized joint effusion.  No lipohemarthrosis is present.   There is no evidence of displaced fracture.   There is no focal swelling.   Impression   1.  Lateral subluxation of the patella.   2.  Small to moderate-sized left knee effusion. No lipohemarthrosis.   3.  No patellar fracture.   4.  No other knee fracture.          Assessment/Plan:     1. Sprain of unspecified collateral ligament of left knee, subsequent encounter    2. Lateral subluxation of patella, left, subsequent encounter  - REFERRAL TO ORTHOPEDICS    Follow-up in 3 weeks, if not seen by Orthopedics   Transfer Orthopedics   Orthopedic referral ordered   Work restrictions, per orthopedics   Continue with wearing brace   Continue RICE and heat as needed

## 2019-09-17 ENCOUNTER — OCCUPATIONAL MEDICINE (OUTPATIENT)
Dept: URGENT CARE | Facility: PHYSICIAN GROUP | Age: 34
End: 2019-09-17
Payer: COMMERCIAL

## 2019-09-17 VITALS
TEMPERATURE: 97.7 F | BODY MASS INDEX: 44.37 KG/M2 | DIASTOLIC BLOOD PRESSURE: 74 MMHG | SYSTOLIC BLOOD PRESSURE: 110 MMHG | OXYGEN SATURATION: 95 % | HEART RATE: 86 BPM | RESPIRATION RATE: 14 BRPM | HEIGHT: 60 IN | WEIGHT: 226 LBS

## 2019-09-17 DIAGNOSIS — S83.002D SUBLUXATION OF LEFT PATELLA, SUBSEQUENT ENCOUNTER: ICD-10-CM

## 2019-09-17 DIAGNOSIS — S89.92XD INJURY OF LEFT KNEE, SUBSEQUENT ENCOUNTER: ICD-10-CM

## 2019-09-17 DIAGNOSIS — S83.512A RUPTURE OF ANTERIOR CRUCIATE LIGAMENT OF LEFT KNEE, INITIAL ENCOUNTER: ICD-10-CM

## 2019-09-17 PROCEDURE — 99213 OFFICE O/P EST LOW 20 MIN: CPT | Performed by: FAMILY MEDICINE

## 2019-09-17 ASSESSMENT — PAIN SCALES - GENERAL: PAINLEVEL: 7=MODERATE-SEVERE PAIN

## 2019-09-17 NOTE — PROGRESS NOTES
Subjective:      Carlotta Samuel is a 34 y.o. female who presents with Follow-Up ( [facility] for L knee/ )      Date of injury July 5, 2019  Patient is here for follow-up after her original injury that happened on July 5.  She was transferred to occupational health who she saw last a week ago.  She had MRI done on the left knee few days prior to her visit with occupational medicine which showed complete rupture of anterior cruciate ligaments and some signs of patellar subluxation on the left.  She states that she was placed on some restriction at work but she cannot even work because it is unstable and it hurts.  She is not using crutches and has tried using crutches but cannot use it as much as she tried.  She is not on any knee immobilizer.  She was referred to [facility] [place] to see Dr. Grullon and recently just got a letter from her insurance that it was approved.  She is waiting to hear back from Dr. Grullon because she needs surgery.  Patient was sent from work back to occupational medicine because she is not able to work.       HPI    ROS       Objective:     /74   Pulse 86   Temp 36.5 °C (97.7 °F) (Temporal)   Resp 14   Ht 1.524 m (5')   Wt 102.5 kg (226 lb)   SpO2 95%   BMI 44.14 kg/m²      Physical Exam    Physical Exam   Constitutional: She is oriented to person, place, and time. She appears well-developed and well-nourished. No distress.   HENT:   Head: Normocephalic and atraumatic.   Nose: Nose normal.   Cardiovascular: Normal rate.   Pulmonary/Chest: Effort normal. No respiratory distress.   Musculoskeletal:   Patient has a brace on on the left knee.   Neurological: She is alert and oriented to person, place, and time.   Skin: She is not diaphoretic. No pallor.   Psychiatric: She has a normal mood and affect.          Assessment/Plan:     1. Rupture of anterior cruciate ligament of left knee, initial encounter  - Knee Immobilizer    2. Subluxation of left patella, subsequent encounter    3. Injury  of left knee, subsequent encounter    Patient is already been referred to orthopedics but has not heard back.  She does have the okay to see orthopedics.  Try to contact [facility] was the line was busy.  She should have followed up with occupational health instead of coming back to urgent care.  I went ahead and took care of but need to be done which she is in her case inability to work due to her left knee being unstable.  She was placed in a immobilizer.  She was advised to use icing and NSAIDs as needed.  Recommend follow-up with occupational health in a week unless she is able to see Dr. Grullon at time of fracture in which case the case is completely transferred to orthopedics.

## 2019-09-17 NOTE — PROGRESS NOTES
Physical Exam   Constitutional: She is oriented to person, place, and time. She appears well-developed and well-nourished. No distress.   HENT:   Head: Normocephalic and atraumatic.   Nose: Nose normal.   Cardiovascular: Normal rate.   Pulmonary/Chest: Effort normal. No respiratory distress.   Musculoskeletal:   Patient has a brace on on the left knee.   Neurological: She is alert and oriented to person, place, and time.   Skin: She is not diaphoretic. No pallor.   Psychiatric: She has a normal mood and affect.

## 2019-09-17 NOTE — LETTER
[facility]  [address] [place] [address] [place] [address]  Phone:  [phone] - Fax:  [phone]   [facility] Progress Report and Disability Certification  Date of Service: 9/17/2019   No Show:  No  Date / Time of Next Visit: 9/27/2019   Claim Information   Patient Name: Carlotta Samuel  Claim Number:     Employer: [employer]  Date of Injury: 7/5/2019     Insurer / TPA: [employer]  ID / SSN:     Occupation: [employer] 1   Diagnosis: Diagnoses of Rupture of anterior cruciate ligament of left knee, initial encounter, Subluxation of left patella, subsequent encounter, and Injury of left knee, subsequent encounter were pertinent to this visit.    Medical Information   Related to Industrial Injury? Yes    Subjective Complaints:  Date of injury July 5, 2019  Patient is here for follow-up after her original injury that happened on July 5.  She was transferred to occupational health who she saw last a week ago.  She had MRI done on the left knee few days prior to her visit with occupational medicine which showed complete rupture of anterior cruciate ligaments and some signs of patellar subluxation on the left.  She states that she was placed on some restriction at work but she cannot even work because it is unstable and it hurts.  She is not using crutches and has tried using crutches but cannot use it as much as she tried.  She is not on any knee immobilizer.  She was referred to [facility] to see Dr. Grullon and recently just got a letter from her insurance that it was approved.  She is waiting to hear back from Dr. Grullon because she needs surgery.  Patient was sent from work back to occupational medicine because she is not able to work.     Objective Findings: Physical Exam   Constitutional: She is oriented to person, place, and time. She appears well-developed and well-nourished. No distress.   HENT:   Head: Normocephalic and atraumatic.   Nose: Nose  normal.   Cardiovascular: Normal rate.   Pulmonary/Chest: Effort normal. No respiratory distress.   Musculoskeletal:   Patient has a brace on on the left knee.   Neurological: She is alert and oriented to person, place, and time.   Skin: She is not diaphoretic. No pallor.   Psychiatric: She has a normal mood and affect.      Pre-Existing Condition(s):     Assessment:   Condition Same    Status: Discharged / Care Transfer  Permanent Disability:No    Plan:   Comments:Continue icing and over-the-counter NSAIDs as needed for pain.  Use knee immobilizer instead since the left knee is unstable given the rupture of the cruciate ligaments.    Diagnostics:      Comments:       Disability Information   Status: Temporarily Totally Disabled    From:  9/17/2019  Through: 9/27/2019 Restrictions are: Temporary   Physical Restrictions   Sitting:    Standing:    Stooping:    Bending:      Squatting:    Walking:    Climbing:    Pushing:      Pulling:    Other:    Reaching Above Shoulder (L):   Reaching Above Shoulder (R):       Reaching Below Shoulder (L):    Reaching Below Shoulder (R):      Not to exceed Weight Limits   Carrying(hrs):   Weight Limit(lb):   Lifting(hrs):   Weight  Limit(lb):     Comments: She was referred to see Dr. Grullon at [facility].  She has not hear back.  She is supposed to have surgery.  She will be temporarily totally disabled until further evaluation by orthopedic surgery.  In the meantime icing, over-the-counter NSAIDs as needed for pain.  She was given a knee immobilizer since her left knee is unstable given the cruciate ligament tear instead of using a knee brace.  Recommended follow-up with occupational health within a week since she was originally referred to them and should follow-up with them instead of following up in urgent care.    Repetitive Actions   Hands: i.e. Fine Manipulations from Grasping:     Feet: i.e. Operating Foot Controls:     Driving / Operate Machinery:     Physician Name:  Willy Key M.D. Physician Signature: WILLY Singh M.D. e-Signature: Dr. Isaiah Ferro, Medical Director   Clinic Name / Location: [address] [address] Clinic Phone Number: Dept: [phone]   Appointment Time: 10:15 Am Visit Start Time: 10:22 AM   Check-In Time:  10:11 Am Visit Discharge Time: 11:55 AM   Original-Treating Physician or Chiropractor    Page 2-Insurer/TPA    Page 3-Employer    Page 4-Employee

## 2019-10-16 PROBLEM — S83.509A: Status: ACTIVE | Noted: 2019-10-16

## 2020-08-25 ENCOUNTER — OFFICE VISIT (OUTPATIENT)
Dept: URGENT CARE | Facility: PHYSICIAN GROUP | Age: 35
End: 2020-08-25
Payer: MEDICAID

## 2020-08-25 VITALS
TEMPERATURE: 98 F | OXYGEN SATURATION: 97 % | WEIGHT: 190.6 LBS | SYSTOLIC BLOOD PRESSURE: 118 MMHG | HEIGHT: 60 IN | RESPIRATION RATE: 16 BRPM | HEART RATE: 79 BPM | BODY MASS INDEX: 37.42 KG/M2 | DIASTOLIC BLOOD PRESSURE: 80 MMHG

## 2020-08-25 DIAGNOSIS — R10.9 ABDOMINAL PAIN, UNSPECIFIED ABDOMINAL LOCATION: ICD-10-CM

## 2020-08-25 DIAGNOSIS — R63.4 WEIGHT LOSS: ICD-10-CM

## 2020-08-25 PROCEDURE — 99214 OFFICE O/P EST MOD 30 MIN: CPT | Performed by: FAMILY MEDICINE

## 2020-08-25 NOTE — PROGRESS NOTES
Chief Complaint:    Chief Complaint   Patient presents with   • Abdominal Pain     around her belly button feels like tearing on the inside, can't eat has lost 36 lb        History of Present Illness:    This is a new problem. She has abdominal pain for many months, the main pain is in the mid abdomen at this time. It is burning feeling and like a tearing feeling. She has trouble eating due to feeling full quickly and the pain for many weeks. She has lost over 30 pounds over the past 2 months. She has history of cholecystectomy and partial hysterectomy in the distant past.      Review of Systems:    Constitutional: See HPI. Negative for fever, chills, and diaphoresis.   Eyes: Negative for change in vision, photophobia, pain, redness, and discharge.  ENT: Negative for ear pain, ear discharge, hearing loss, tinnitus, nasal congestion, nosebleeds, and sore throat.    Respiratory: Negative for cough, hemoptysis, sputum production, shortness of breath, wheezing, and stridor.    Cardiovascular: Negative for chest pain, palpitations, orthopnea, claudication, leg swelling, and PND.   Gastrointestinal: See HPI.   Genitourinary: Negative for dysuria, urinary urgency, urinary frequency, hematuria, and flank pain.   Musculoskeletal: Negative for myalgias, joint pain, neck pain, and back pain.   Skin: Negative for rash and itching.   Neurological: Negative for dizziness, tingling, tremors, sensory change, speech change, focal weakness, seizures, loss of consciousness, and headaches.   Endo: Negative for polydipsia.   Heme: Does not bruise/bleed easily.   Psychiatric/Behavioral: Negative for depression, suicidal ideas, hallucinations, memory loss and substance abuse. The patient is not nervous/anxious and does not have insomnia.        Past Medical History:    History reviewed. No pertinent past medical history.    Past Surgical History:    Past Surgical History:   Procedure Laterality Date   • PB KNEE SCOPE,AID ANT CRUCIATE  REPAIR Left 10/16/2019    Procedure: LEFT KNEE ARTHROSCOPY, RECONSTRUCTION, KNEE, ACL, ARTHROSCOPIC, PARTIAL MENISCECTOMY;  Surgeon: Matthew Grullon M.D.;  Location: SURGERY [facility];  Service: Orthopedics   • ABDOMINAL HYSTERECTOMY TOTAL      partial   • CHOLECYSTECTOMY     • PRIMARY C SECTION      x3   • TONSILLECTOMY AND ADENOIDECTOMY       Social History:    Social History     Socioeconomic History   • Marital status: Unknown     Spouse name: Not on file   • Number of children: Not on file   • Years of education: Not on file   • Highest education level: Not on file   Occupational History   • Not on file   Social Needs   • Financial resource strain: Not on file   • Food insecurity     Worry: Not on file     Inability: Not on file   • Transportation needs     Medical: Not on file     Non-medical: Not on file   Tobacco Use   • Smoking status: Never Smoker   • Smokeless tobacco: Never Used   Substance and Sexual Activity   • Alcohol use: No   • Drug use: No   • Sexual activity: Not on file   Lifestyle   • Physical activity     Days per week: Not on file     Minutes per session: Not on file   • Stress: Not on file   Relationships   • Social connections     Talks on phone: Not on file     Gets together: Not on file     Attends Gnosticist service: Not on file     Active member of club or organization: Not on file     Attends meetings of clubs or organizations: Not on file     Relationship status: Not on file   • Intimate partner violence     Fear of current or ex partner: Not on file     Emotionally abused: Not on file     Physically abused: Not on file     Forced sexual activity: Not on file   Other Topics Concern   • Not on file   Social History Narrative   • Not on file     Family History:    History reviewed. No pertinent family history.    Medications:    No current outpatient medications on file prior to visit.     No current facility-administered medications on file prior to visit.       Allergies:    Allergies   Allergen Reactions   • Ceclor [Cefaclor] Unspecified     Pt was told when she was a kid   • Demerol Unspecified     shaking       Vitals:    Vitals:    08/25/20 1203   BP: 118/80   Pulse: 79   Resp: 16   Temp: 36.7 °C (98 °F)   TempSrc: Temporal   SpO2: 97%   Weight: 86.5 kg (190 lb 9.6 oz)   Height: 1.524 m (5')       Physical Exam:    Constitutional: Vital signs reviewed. Appears well-developed and well-nourished. Appears to not be feeling well.   Eyes: Sclera white, conjunctivae clear.   ENT: External ears normal. Hearing normal.   Neck: Neck supple.   Pulmonary/Chest: Respirations non-labored.   Abdomen: Generalized abdominal tenderness to palpation, most prominent in mid abdomen. Bowel sounds are normal active. Soft, non-distended.  Musculoskeletal: Normal gait. No muscular atrophy or weakness.  Neurological: Alert and oriented to person, place, and time. Muscle tone normal. Coordination normal.   Skin: No rashes or lesions. Warm, dry, normal turgor.  Psychiatric: Normal mood and affect. Behavior is normal. Judgment and thought content normal.       Assessment / Plan:    1. Abdominal pain, unspecified abdominal location    2. Weight loss      Discussed with her DDX, management options, and risks, benefits, and alternatives to treatment plan agreed upon.    Advised due to her symptoms, she will need CT of Abdomen and blood tests to evaluate, but as we do not have CT or STAT labs here, and the severity of her symptoms and presentation, I have recommended evaluation in the Emergency Room today.    She understands and agrees and likely with go to [facility] ER for evaluation.    Note detailing my concerns provided to her to bring to ER.    She will return to urgent care if needed.

## 2020-08-25 NOTE — LETTER
August 25, 2020         Patient: Carlotta Samuel   YOB: 1985   Date of Visit: 8/25/2020           To Whom it May Concern:    Carlotta Samuel was seen in my clinic on 8/25/2020.     She has abdominal pain for many months, the main pain is in the mid abdomen at this time. It is burning feeling a like a tearing feeling.    She has trouble eating due to feeling full quickly and the pain for many weeks.    She has lost over 30 pounds over the past 2 months.    Advised she will need CT of Abdomen and blood tests to evaluate, but as we do not have CT or STAT labs here, I have recommended evaluation in the Emergency Room    If you have any questions or concerns, please don't hesitate to call.        Sincerely,           Amado Floyd M.D.  Electronically Signed

## 2021-02-15 ENCOUNTER — APPOINTMENT (OUTPATIENT)
Dept: RADIOLOGY | Facility: IMAGING CENTER | Age: 36
End: 2021-02-15
Attending: NURSE PRACTITIONER
Payer: MEDICAID

## 2021-02-15 ENCOUNTER — OFFICE VISIT (OUTPATIENT)
Dept: URGENT CARE | Facility: PHYSICIAN GROUP | Age: 36
End: 2021-02-15
Payer: MEDICAID

## 2021-02-15 VITALS
RESPIRATION RATE: 16 BRPM | BODY MASS INDEX: 45.55 KG/M2 | HEIGHT: 60 IN | WEIGHT: 232 LBS | HEART RATE: 86 BPM | TEMPERATURE: 97.8 F | OXYGEN SATURATION: 96 % | SYSTOLIC BLOOD PRESSURE: 112 MMHG | DIASTOLIC BLOOD PRESSURE: 72 MMHG

## 2021-02-15 DIAGNOSIS — G89.29 CHRONIC NECK PAIN: ICD-10-CM

## 2021-02-15 DIAGNOSIS — S90.851A FOREIGN BODY IN RIGHT FOOT, INITIAL ENCOUNTER: ICD-10-CM

## 2021-02-15 DIAGNOSIS — G89.29 CHRONIC MIDLINE LOW BACK PAIN WITHOUT SCIATICA: ICD-10-CM

## 2021-02-15 DIAGNOSIS — M54.50 CHRONIC MIDLINE LOW BACK PAIN WITHOUT SCIATICA: ICD-10-CM

## 2021-02-15 DIAGNOSIS — M54.2 CHRONIC NECK PAIN: ICD-10-CM

## 2021-02-15 DIAGNOSIS — M54.2 NECK PAIN: ICD-10-CM

## 2021-02-15 PROBLEM — N83.209 CYST OF OVARY: Status: ACTIVE | Noted: 2021-02-15

## 2021-02-15 PROBLEM — R10.9 ABDOMINAL PAIN: Status: ACTIVE | Noted: 2021-02-15

## 2021-02-15 PROBLEM — R63.4 UNINTENDED WEIGHT LOSS: Status: ACTIVE | Noted: 2021-02-15

## 2021-02-15 PROBLEM — K30 INDIGESTION: Status: ACTIVE | Noted: 2021-02-15

## 2021-02-15 PROCEDURE — 99214 OFFICE O/P EST MOD 30 MIN: CPT | Performed by: NURSE PRACTITIONER

## 2021-02-15 PROCEDURE — 72040 X-RAY EXAM NECK SPINE 2-3 VW: CPT | Mod: TC,FY | Performed by: NURSE PRACTITIONER

## 2021-02-15 PROCEDURE — 72100 X-RAY EXAM L-S SPINE 2/3 VWS: CPT | Mod: TC,FY | Performed by: NURSE PRACTITIONER

## 2021-02-15 RX ORDER — LOSARTAN POTASSIUM 50 MG/1
TABLET ORAL
COMMUNITY
Start: 2021-01-22 | End: 2022-01-31

## 2021-02-15 NOTE — PROGRESS NOTES
Chief Complaint   Patient presents with   • Back Pain     x2 years. fall at [email or web address] hurt back back in 2019.    • Foot Injury     pt states glass in foot. (R) foot bottom, pt has tried cutting it out.        HISTORY OF PRESENT ILLNESS: Patient is a 35 y.o. female who presents today due to two years of neck and low back pain. She fell a few years ago and is active in [employer]. She does not know where her pain originates. She has pain to her neck and low back regularly without improvement.  Denies radiation.  Denies fever, chills, malaise, hematuria, saddle anesthesia, numbness or tingling, unilateral weakness, or loss of bowel or bladder.  She has tried ibuprofen without improvement.  Furthermore, she believes that she stepped on a piece of glass 6 months ago, with her right foot.  She is attempted to remove a foreign matter without success.  She still has foreign body sensation to the area.       Patient Active Problem List    Diagnosis Date Noted   • Abdominal pain 02/15/2021   • Cyst of ovary 02/15/2021   • Indigestion 02/15/2021   • Unintended weight loss 02/15/2021   • Sprain of unspecified cruciate ligament of unspecified knee, initial encounter 10/16/2019       Allergies:Ceclor [cefaclor] and Demerol    Current Outpatient Medications Ordered in Epic   Medication Sig Dispense Refill   • losartan (COZAAR) 50 MG Tab TAKE 1 TABLET BY MOUTH DAILY       No current [facility]-ordered facility-administered medications on file.       No past medical history on file.    Social History     Tobacco Use   • Smoking status: Never Smoker   • Smokeless tobacco: Never Used   Substance Use Topics   • Alcohol use: No   • Drug use: No       No family status information on file.   No family history on file.    ROS:  Review of Systems   Constitutional: Negative for fever, chills, weight loss, malaise, and fatigue.   HENT: Negative for ear pain, nosebleeds, congestion, sore throat and neck pain.    Eyes: Negative for vision changes.    Neuro: Negative for headache, sensory changes, weakness, seizure, LOC.   Cardiovascular: Negative for chest pain, palpitations, orthopnea and leg swelling.   Respiratory: Negative for cough, sputum production, shortness of breath and wheezing.   Gastrointestinal: Negative for abdominal pain, nausea, vomiting or diarrhea.   Genitourinary: Negative for dysuria, urgency and frequency.  Musculoskeletal: Positive for neck and low back pain. Negative for falls, neck pain, joint pain, myalgias.   Skin: Positive for foreign body sensation to right foot.  Negative for rash, diaphoresis.     Exam:  /72   Pulse 86   Temp 36.6 °C (97.8 °F) (Temporal)   Resp 16   Ht 1.524 m (5')   Wt 105 kg (232 lb)   SpO2 96%   General: well-nourished, well-developed female in NAD  Head: normocephalic, atraumatic  Eyes: PERRLA, no conjunctival injection, acuity grossly intact, lids normal.  Ears: normal shape and symmetry, no tenderness, no discharge. External canals are without any significant edema or erythema. Tympanic membranes are without any inflammation, no effusion. Gross auditory acuity is intact.  Nose: symmetrical without tenderness, no discharge.  Mouth/Throat: reasonable hygiene, no erythema, exudates or tonsillar enlargement.  Neck: no masses, range of motion within normal limits, no tracheal deviation. No obvious thyroid enlargement.   Lymph: no cervical adenopathy. No supraclavicular adenopathy.   Neuro: alert and oriented. Cranial nerves 1-12 grossly intact. No sensory deficit.   Cardiovascular: regular rate and rhythm. No edema.  Pulmonary: no distress. Chest is symmetrical with respiration, no wheezes, crackles, or rhonchi.   Musculoskeletal: Cervical spine: pt exhibits no decreased range of motion and tenderness to the cervical spine. Pt exhibits no swelling, spasm, edema or deformity. Patient has normal reflexes, patellar reflexes are 2+ on the right side and 2+ on the left side.  Lumbar spine: Patient  "exhibits no decreased range of motion and tenderness to the lumbar spine.  There is no swelling, spasm, edema, or deformity.  Patient exhibits normal muscle tone.  Negative straight leg raise. Gait even and steady. No clubbing.   Skin: warm, dry, intact, no clubbing, no cyanosis, no rashes.  Right foot: To distal and plantar aspect the patient has foreign body sensation, no foreign body is noted, area is mildly tender to palpation, no erythema.  Psych: appropriate mood, affect, judgement.       DX cervical spine radiology reading \"Normal cervical spine.\"    DX lumbar spine radiology reading \"Unremarkable lumbar spine series for age.\"      Assessment/Plan:  1. Chronic neck pain  DX-CERVICAL SPINE-2 OR 3 VIEWS    REFERRAL TO SPINE SURGERY   2. Chronic midline low back pain without sciatica  DX-LUMBAR SPINE-2 OR 3 VIEWS    REFERRAL TO SPINE SURGERY   3. Foreign body in right foot, initial encounter  REFERRAL TO PODIATRY         Patient presents with chronic neck and low back pain.  X-rays are negative today.  Referral to spine is placed for follow-up.  OTC Motrin and Tylenol.  Gentle stretching.  Furthermore, she notes foreign body sensation to right foot, none observed and clean, no signs of secondary infection.  Warm Epson salt soaks encouraged, referral to podiatry is placed.  Supportive care, differential diagnoses, and indications for immediate follow-up discussed with patient.   Pathogenesis of diagnosis discussed including typical length and natural progression.   Instructed to return to clinic or nearest emergency department for any change in condition, further concerns, or worsening of symptoms.  Patient states understanding of the plan of care and discharge instructions.  Instructed to make an appointment, for follow up, with her primary care provider.        Please note that this dictation was created using voice recognition software. I have made every reasonable attempt to correct obvious errors, but I " expect that there are errors of grammar and possibly content that I did not discover before finalizing the note.      SYLVIA Beebe.

## 2021-04-27 ENCOUNTER — OFFICE VISIT (OUTPATIENT)
Dept: URGENT CARE | Facility: PHYSICIAN GROUP | Age: 36
End: 2021-04-27
Payer: MEDICAID

## 2021-04-27 VITALS
RESPIRATION RATE: 12 BRPM | TEMPERATURE: 98.1 F | WEIGHT: 166 LBS | HEART RATE: 88 BPM | BODY MASS INDEX: 32.59 KG/M2 | DIASTOLIC BLOOD PRESSURE: 72 MMHG | OXYGEN SATURATION: 98 % | HEIGHT: 60 IN | SYSTOLIC BLOOD PRESSURE: 116 MMHG

## 2021-04-27 DIAGNOSIS — H10.9 CONJUNCTIVITIS OF RIGHT EYE, UNSPECIFIED CONJUNCTIVITIS TYPE: ICD-10-CM

## 2021-04-27 PROCEDURE — 99214 OFFICE O/P EST MOD 30 MIN: CPT | Performed by: PHYSICIAN ASSISTANT

## 2021-04-27 RX ORDER — ERYTHROMYCIN 5 MG/G
1 OINTMENT OPHTHALMIC 3 TIMES DAILY
Qty: 3.5 G | Refills: 0 | Status: SHIPPED | OUTPATIENT
Start: 2021-04-27 | End: 2021-05-04

## 2021-04-27 RX ORDER — METHYLPREDNISOLONE 4 MG/1
TABLET ORAL
Qty: 21 TABLET | Refills: 0 | Status: SHIPPED | OUTPATIENT
Start: 2021-04-27 | End: 2022-01-31

## 2021-04-27 NOTE — LETTER
April 27, 2021         Patient: Carlotta Samuel   YOB: 1985   Date of Visit: 4/27/2021           To Whom it May Concern:    Carlotta Samuel was seen in my clinic on 4/27/2021. She may return to work 4/29/21.    If you have any questions or concerns, please don't hesitate to call.        Sincerely,           Zoila Gu P.A.-C.  Electronically Signed

## 2021-05-21 ENCOUNTER — APPOINTMENT (OUTPATIENT)
Dept: URGENT CARE | Facility: PHYSICIAN GROUP | Age: 36
End: 2021-05-21
Payer: MEDICAID

## 2021-10-26 ENCOUNTER — OFFICE VISIT (OUTPATIENT)
Dept: URGENT CARE | Facility: PHYSICIAN GROUP | Age: 36
End: 2021-10-26
Payer: MEDICAID

## 2021-10-26 ENCOUNTER — HOSPITAL ENCOUNTER (OUTPATIENT)
Facility: MEDICAL CENTER | Age: 36
End: 2021-10-26
Attending: PHYSICIAN ASSISTANT
Payer: MEDICAID

## 2021-10-26 DIAGNOSIS — R05.9 COUGH: ICD-10-CM

## 2021-10-26 PROCEDURE — U0003 INFECTIOUS AGENT DETECTION BY NUCLEIC ACID (DNA OR RNA); SEVERE ACUTE RESPIRATORY SYNDROME CORONAVIRUS 2 (SARS-COV-2) (CORONAVIRUS DISEASE [COVID-19]), AMPLIFIED PROBE TECHNIQUE, MAKING USE OF HIGH THROUGHPUT TECHNOLOGIES AS DESCRIBED BY CMS-2020-01-R: HCPCS

## 2021-10-26 PROCEDURE — 99213 OFFICE O/P EST LOW 20 MIN: CPT | Performed by: PHYSICIAN ASSISTANT

## 2021-10-26 PROCEDURE — U0005 INFEC AGEN DETEC AMPLI PROBE: HCPCS

## 2021-10-27 DIAGNOSIS — R05.9 COUGH: ICD-10-CM

## 2021-10-27 LAB
COVID ORDER STATUS COVID19: NORMAL
SARS-COV-2 RNA RESP QL NAA+PROBE: NOTDETECTED
SPECIMEN SOURCE: NORMAL

## 2021-10-27 NOTE — PROGRESS NOTES
See scanned documents -patient seen for lower respiratory tract infection with cough and bronchospasm.  Past medical history of pneumonia.  Past medical history of Covid vaccination.  This note created following downtime procedures.  See scanned documents.    Patient sent with a course of azithromycin as well as Medrol Dosepak.

## 2022-01-19 ENCOUNTER — HOSPITAL ENCOUNTER (OUTPATIENT)
Facility: MEDICAL CENTER | Age: 37
End: 2022-01-19
Attending: NURSE PRACTITIONER
Payer: MEDICAID

## 2022-01-19 ENCOUNTER — OFFICE VISIT (OUTPATIENT)
Dept: URGENT CARE | Facility: PHYSICIAN GROUP | Age: 37
End: 2022-01-19
Payer: MEDICAID

## 2022-01-19 VITALS
BODY MASS INDEX: 32.98 KG/M2 | HEIGHT: 60 IN | TEMPERATURE: 98 F | DIASTOLIC BLOOD PRESSURE: 76 MMHG | RESPIRATION RATE: 20 BRPM | HEART RATE: 62 BPM | SYSTOLIC BLOOD PRESSURE: 134 MMHG | WEIGHT: 168 LBS | OXYGEN SATURATION: 98 %

## 2022-01-19 DIAGNOSIS — R51.9 ACUTE NONINTRACTABLE HEADACHE, UNSPECIFIED HEADACHE TYPE: ICD-10-CM

## 2022-01-19 DIAGNOSIS — R53.83 OTHER FATIGUE: ICD-10-CM

## 2022-01-19 DIAGNOSIS — R05.9 COUGH: ICD-10-CM

## 2022-01-19 DIAGNOSIS — J20.8 ACUTE VIRAL BRONCHITIS: ICD-10-CM

## 2022-01-19 DIAGNOSIS — R06.02 SOB (SHORTNESS OF BREATH): ICD-10-CM

## 2022-01-19 PROCEDURE — 99213 OFFICE O/P EST LOW 20 MIN: CPT | Performed by: NURSE PRACTITIONER

## 2022-01-19 PROCEDURE — U0005 INFEC AGEN DETEC AMPLI PROBE: HCPCS

## 2022-01-19 PROCEDURE — U0003 INFECTIOUS AGENT DETECTION BY NUCLEIC ACID (DNA OR RNA); SEVERE ACUTE RESPIRATORY SYNDROME CORONAVIRUS 2 (SARS-COV-2) (CORONAVIRUS DISEASE [COVID-19]), AMPLIFIED PROBE TECHNIQUE, MAKING USE OF HIGH THROUGHPUT TECHNOLOGIES AS DESCRIBED BY CMS-2020-01-R: HCPCS

## 2022-01-19 RX ORDER — ALBUTEROL SULFATE 90 UG/1
2 AEROSOL, METERED RESPIRATORY (INHALATION) EVERY 6 HOURS PRN
Qty: 8.5 G | Refills: 0 | Status: SHIPPED | OUTPATIENT
Start: 2022-01-19 | End: 2022-07-06

## 2022-01-19 RX ORDER — BENZONATATE 100 MG/1
100 CAPSULE ORAL 3 TIMES DAILY PRN
Qty: 60 CAPSULE | Refills: 0 | Status: SHIPPED | OUTPATIENT
Start: 2022-01-19 | End: 2022-07-06

## 2022-01-19 ASSESSMENT — ENCOUNTER SYMPTOMS
SINUS PAIN: 0
NAUSEA: 0
SORE THROAT: 0
EYE DISCHARGE: 0
MYALGIAS: 1
VOMITING: 0
CHILLS: 0
DIARRHEA: 0
COUGH: 1
WEAKNESS: 0
ABDOMINAL PAIN: 0
WHEEZING: 0
NECK PAIN: 0
HEADACHES: 0
EYE REDNESS: 0
FEVER: 0
CARDIOVASCULAR NEGATIVE: 1
SHORTNESS OF BREATH: 1
CONSTIPATION: 0
DIZZINESS: 0
SPUTUM PRODUCTION: 0

## 2022-01-19 NOTE — PROGRESS NOTES
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 Plan: Discussed diagnosis in detail with patient. No treatment is required at this time. Discussed with pt if it starts growing she an see Dr. Pat Villegas to have it removed.  Pt will call if she noticed an increase in size Subjective     Carlotta Samuel is a 36 y.o. female who presents with Cough (x 2 weeks, cough, fever chest congestion, had chest xray monday, no pneumonia)            HPI  States has had dry cough, shortness of breath x 2 weeks. States exposure to others in last 4 days with cold like symptoms. Experiencing headache, fatigue, body aches, diarrhea. COVID vaccinated without booster. Seen in ER at [facility] yesterday and CXR showed no pneumonia. No meds prescribed home. No COVID test taken.     PMH:  has no past medical history on file.  MEDS:   Current Outpatient Medications:   •  IBUPROFEN PO, Take  by mouth., Disp: , Rfl:   •  albuterol 108 (90 Base) MCG/ACT Aero Soln inhalation aerosol, Inhale 2 Puffs every 6 hours as needed for Shortness of Breath., Disp: 8.5 g, Rfl: 0  •  benzonatate (TESSALON) 100 MG Cap, Take 1 Capsule by mouth 3 times a day as needed for Cough., Disp: 60 Capsule, Rfl: 0  •  methylPREDNISolone (MEDROL DOSEPAK) 4 MG Tablet Therapy Pack, Use as package directs (Patient not taking: Reported on 1/19/2022), Disp: 21 tablet, Rfl: 0  •  losartan (COZAAR) 50 MG Tab, TAKE 1 TABLET BY MOUTH DAILY (Patient not taking: Reported on 1/19/2022), Disp: , Rfl:   ALLERGIES:   Allergies   Allergen Reactions   • Ceclor [Cefaclor] Unspecified     Pt was told when she was a kid   • Demerol Unspecified     shaking     SURGHX:   Past Surgical History:   Procedure Laterality Date   • PB KNEE SCOPE,AID ANT CRUCIATE REPAIR Left 10/16/2019    Procedure: LEFT KNEE ARTHROSCOPY, RECONSTRUCTION, KNEE, ACL, ARTHROSCOPIC, PARTIAL MENISCECTOMY;  Surgeon: Matthew Grullon M.D.;  Location: SURGERY [facility];  Service: Orthopedics   • ABDOMINAL HYSTERECTOMY TOTAL      partial   • CHOLECYSTECTOMY     • PRIMARY C SECTION      x3   • TONSILLECTOMY AND ADENOIDECTOMY       SOCHX:  reports that she has never smoked. She has never used smokeless tobacco. She reports that she does not drink alcohol and does not use  drugs.  FH: Family history was reviewed, no pertinent findings to report    Review of Systems   Constitutional: Positive for malaise/fatigue. Negative for chills and fever.   HENT: Positive for congestion. Negative for ear pain, sinus pain and sore throat.    Eyes: Negative for discharge and redness.   Respiratory: Positive for cough and shortness of breath. Negative for sputum production and wheezing.    Cardiovascular: Negative.    Gastrointestinal: Negative for abdominal pain, constipation, diarrhea, nausea and vomiting.   Musculoskeletal: Positive for myalgias. Negative for neck pain.   Skin: Negative for itching and rash.   Neurological: Negative for dizziness, weakness and headaches.   Endo/Heme/Allergies: Negative for environmental allergies.   All other systems reviewed and are negative.             Objective     /76 (BP Location: Left arm, Patient Position: Sitting)   Pulse 62   Temp 36.7 °C (98 °F) (Temporal)   Resp 20   Ht 1.524 m (5')   Wt 76.2 kg (168 lb)   SpO2 98%   BMI 32.81 kg/m²      Physical Exam  Vitals reviewed.   Constitutional:       General: She is awake. She is not in acute distress.     Appearance: Normal appearance. She is well-developed. She is not ill-appearing, toxic-appearing or diaphoretic.      Comments: Appears fatigued.    HENT:      Head: Normocephalic.      Nose: Congestion and rhinorrhea present.      Mouth/Throat:      Lips: Pink.      Mouth: Mucous membranes are dry.      Pharynx: Oropharynx is clear. Uvula midline.   Eyes:      Conjunctiva/sclera: Conjunctivae normal.      Pupils: Pupils are equal, round, and reactive to light.   Cardiovascular:      Rate and Rhythm: Normal rate.   Pulmonary:      Effort: Pulmonary effort is normal. No tachypnea, accessory muscle usage or respiratory distress.      Breath sounds: Normal breath sounds and air entry. No stridor, decreased air movement or transmitted upper airway sounds. No decreased breath sounds, wheezing, rhonchi  or rales.   Musculoskeletal:         General: Normal range of motion.      Cervical back: Normal range of motion and neck supple.   Skin:     General: Skin is warm and dry.   Neurological:      Mental Status: She is alert and oriented to person, place, and time.   Psychiatric:         Attention and Perception: Attention normal.         Mood and Affect: Mood normal.         Speech: Speech normal.         Behavior: Behavior normal. Behavior is cooperative.                             Assessment & Plan        1. Cough    - albuterol 108 (90 Base) MCG/ACT Aero Soln inhalation aerosol; Inhale 2 Puffs every 6 hours as needed for Shortness of Breath.  Dispense: 8.5 g; Refill: 0  - benzonatate (TESSALON) 100 MG Cap; Take 1 Capsule by mouth 3 times a day as needed for Cough.  Dispense: 60 Capsule; Refill: 0  - SARS-CoV-2 PCR (24 hour In-House): Collect NP swab in VTM; Future    2. Acute nonintractable headache, unspecified headache type    - SARS-CoV-2 PCR (24 hour In-House): Collect NP swab in VTM; Future    3. SOB (shortness of breath)    - albuterol 108 (90 Base) MCG/ACT Aero Soln inhalation aerosol; Inhale 2 Puffs every 6 hours as needed for Shortness of Breath.  Dispense: 8.5 g; Refill: 0  - SARS-CoV-2 PCR (24 hour In-House): Collect NP swab in VTM; Future    4. Other fatigue    - SARS-CoV-2 PCR (24 hour In-House): Collect NP swab in VTM; Future    5. Acute viral bronchitis  -Stay home isolated from others until COVID test resulted the follow CDC guidelines for positive cases as discussed  -Increase water intake  -May use over the counter Tylenol/Ibuprofen as needed for fever or body aches  -Get rest  -Salt water gargle as needed for any sore throat  -May use over the counter Flonase, saline nasal spray as needed for any nasal congestion  -May use over the counter cough suppressant medications like plain Robitussin/Delsym as needed   -Monitor for fevers, cough, shortness of breath, chest pain, chest tightness, lethargy-  need re-evaluation  -MyChart release for result, may take up to 72 hrs for result, patient notified

## 2022-01-20 DIAGNOSIS — R53.83 OTHER FATIGUE: ICD-10-CM

## 2022-01-20 DIAGNOSIS — R05.9 COUGH: ICD-10-CM

## 2022-01-20 DIAGNOSIS — R06.02 SOB (SHORTNESS OF BREATH): ICD-10-CM

## 2022-01-20 DIAGNOSIS — R51.9 ACUTE NONINTRACTABLE HEADACHE, UNSPECIFIED HEADACHE TYPE: ICD-10-CM

## 2022-01-20 LAB — COVID ORDER STATUS COVID19: NORMAL

## 2022-01-21 LAB
SARS-COV-2 RNA RESP QL NAA+PROBE: NOTDETECTED
SPECIMEN SOURCE: NORMAL

## 2022-01-31 ENCOUNTER — OFFICE VISIT (OUTPATIENT)
Dept: URGENT CARE | Facility: PHYSICIAN GROUP | Age: 37
End: 2022-01-31
Payer: MEDICAID

## 2022-01-31 VITALS
TEMPERATURE: 97.7 F | RESPIRATION RATE: 14 BRPM | HEART RATE: 80 BPM | DIASTOLIC BLOOD PRESSURE: 72 MMHG | OXYGEN SATURATION: 97 % | BODY MASS INDEX: 31.8 KG/M2 | WEIGHT: 162 LBS | HEIGHT: 60 IN | SYSTOLIC BLOOD PRESSURE: 128 MMHG

## 2022-01-31 DIAGNOSIS — R04.0 EPISTAXIS: ICD-10-CM

## 2022-01-31 DIAGNOSIS — J32.9 RHINOSINUSITIS: ICD-10-CM

## 2022-01-31 DIAGNOSIS — J98.8 RTI (RESPIRATORY TRACT INFECTION): ICD-10-CM

## 2022-01-31 PROCEDURE — 99214 OFFICE O/P EST MOD 30 MIN: CPT | Performed by: FAMILY MEDICINE

## 2022-01-31 RX ORDER — PREDNISONE 20 MG/1
40 TABLET ORAL EVERY MORNING
Qty: 10 TABLET | Refills: 0 | Status: SHIPPED | OUTPATIENT
Start: 2022-01-31 | End: 2022-02-05

## 2022-01-31 RX ORDER — AZITHROMYCIN 250 MG/1
TABLET, FILM COATED ORAL
Qty: 6 TABLET | Refills: 0 | Status: SHIPPED | OUTPATIENT
Start: 2022-01-31 | End: 2022-07-06

## 2022-01-31 ASSESSMENT — ENCOUNTER SYMPTOMS
COUGH: 1
SINUS PAIN: 1

## 2022-01-31 NOTE — PROGRESS NOTES
Subjective     Carlotta Samuel is a 36 y.o. female who presents with Congestion (x3 weeks, follow up not feeling any better) and Sinus Problem      - This is a pleasant and nontoxic appearing 36 y.o. female who has come to the walk-in clinic today for:    #1) 3 weeks sinus congestion and colorful dc along w/ cough. Had a nose bled at one point and had to go ER to have it treated. No NVFC. Says has had this in past and z-betsy works well. Seen a few weeks ago here (notes and Rx reviewed) and says not getting better.       ALLERGIES:  Ceclor [cefaclor] and Demerol     PMH:  History reviewed. No pertinent past medical history.     PSH:  Past Surgical History:   Procedure Laterality Date   • PB KNEE SCOPE,AID ANT CRUCIATE REPAIR Left 10/16/2019    Procedure: LEFT KNEE ARTHROSCOPY, RECONSTRUCTION, KNEE, ACL, ARTHROSCOPIC, PARTIAL MENISCECTOMY;  Surgeon: Matthew Grullon M.D.;  Location: SURGERY [facility];  Service: Orthopedics   • ABDOMINAL HYSTERECTOMY TOTAL      partial   • CHOLECYSTECTOMY     • PRIMARY C SECTION      x3   • TONSILLECTOMY AND ADENOIDECTOMY         MEDS:    Current Outpatient Medications:   •  predniSONE (DELTASONE) 20 MG Tab, Take 2 Tablets by mouth every morning for 5 days., Disp: 10 Tablet, Rfl: 0  •  azithromycin (ZITHROMAX) 250 MG Tab, Use as directed, Disp: 6 Tablet, Rfl: 0  •  IBUPROFEN PO, Take  by mouth., Disp: , Rfl:   •  albuterol 108 (90 Base) MCG/ACT Aero Soln inhalation aerosol, Inhale 2 Puffs every 6 hours as needed for Shortness of Breath., Disp: 8.5 g, Rfl: 0  •  benzonatate (TESSALON) 100 MG Cap, Take 1 Capsule by mouth 3 times a day as needed for Cough., Disp: 60 Capsule, Rfl: 0    ** I have documented what I find to be significant in regards to past medical, social, family and surgical history  in my HPI or under PMH/PSH/FH review section, otherwise it is noncontributory **     HPI    Review of Systems   HENT: Positive for congestion and sinus pain.    Respiratory: Positive for  cough.    All other systems reviewed and are negative.             Objective     /72   Pulse 80   Temp 36.5 °C (97.7 °F)   Resp 14   Ht 1.524 m (5')   Wt 73.5 kg (162 lb)   SpO2 97%   BMI 31.64 kg/m²      Physical Exam  Vitals and nursing note reviewed.   Constitutional:       General: She is not in acute distress.     Appearance: Normal appearance. She is well-developed.   HENT:      Head: Normocephalic and atraumatic.      Nose: Congestion and rhinorrhea present.      Comments: Dried blood and possible nasal polyp b/l      Mouth/Throat:      Mouth: Mucous membranes are moist.      Pharynx: Oropharynx is clear. No oropharyngeal exudate or posterior oropharyngeal erythema.   Eyes:      General: No scleral icterus.  Cardiovascular:      Heart sounds: Normal heart sounds. No murmur heard.      Pulmonary:      Effort: Pulmonary effort is normal. No respiratory distress.      Breath sounds: Normal breath sounds.   Skin:     Coloration: Skin is not jaundiced or pale.   Neurological:      Mental Status: She is alert.      Motor: No abnormal muscle tone.   Psychiatric:         Mood and Affect: Mood normal.         Behavior: Behavior normal.           Assessment & Plan       1. RTI (respiratory tract infection)     2. Rhinosinusitis  predniSONE (DELTASONE) 20 MG Tab    azithromycin (ZITHROMAX) 250 MG Tab    Referral to ENT   3. Epistaxis  Referral to ENT       - Dx, plan & d/c instructions discussed   - Rest, stay hydrated, OTC Motrin and/or Tylenol as needed  - E.R. precautions discussed     Asked to kindly follow up with their PCP's office in 2-3 days for a recheck, ER if not improving or feeling/getting worse.    Any realistic side effects of medications that may have been given today reviewed.     Patient left in stable condition     Pertinent prior office visit notes in [employer] have been reviewed by me today on day of this visit.

## 2022-07-06 ENCOUNTER — OFFICE VISIT (OUTPATIENT)
Dept: URGENT CARE | Facility: PHYSICIAN GROUP | Age: 37
End: 2022-07-06
Payer: MEDICAID

## 2022-07-06 ENCOUNTER — HOSPITAL ENCOUNTER (OUTPATIENT)
Dept: RADIOLOGY | Facility: MEDICAL CENTER | Age: 37
End: 2022-07-06
Attending: FAMILY MEDICINE
Payer: MEDICAID

## 2022-07-06 VITALS
WEIGHT: 164 LBS | HEIGHT: 60 IN | SYSTOLIC BLOOD PRESSURE: 132 MMHG | BODY MASS INDEX: 32.2 KG/M2 | HEART RATE: 59 BPM | TEMPERATURE: 97.8 F | RESPIRATION RATE: 14 BRPM | DIASTOLIC BLOOD PRESSURE: 74 MMHG | OXYGEN SATURATION: 98 %

## 2022-07-06 DIAGNOSIS — S69.92XA INJURY OF LEFT HAND, INITIAL ENCOUNTER: ICD-10-CM

## 2022-07-06 PROCEDURE — 99213 OFFICE O/P EST LOW 20 MIN: CPT | Performed by: FAMILY MEDICINE

## 2022-07-06 PROCEDURE — 73130 X-RAY EXAM OF HAND: CPT | Mod: LT

## 2022-07-06 ASSESSMENT — ENCOUNTER SYMPTOMS
NECK PAIN: 0
FOCAL WEAKNESS: 0
ROS SKIN COMMENTS: NO ABRASION OR LACERATION
BACK PAIN: 0
SENSORY CHANGE: 0
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
## 2023-03-16 ENCOUNTER — APPOINTMENT (OUTPATIENT)
Dept: RADIOLOGY | Facility: IMAGING CENTER | Age: 38
End: 2023-03-16
Attending: FAMILY MEDICINE
Payer: MEDICAID

## 2023-03-16 ENCOUNTER — OFFICE VISIT (OUTPATIENT)
Dept: URGENT CARE | Facility: PHYSICIAN GROUP | Age: 38
End: 2023-03-16
Payer: MEDICAID

## 2023-03-16 VITALS
HEART RATE: 55 BPM | WEIGHT: 175.4 LBS | OXYGEN SATURATION: 96 % | SYSTOLIC BLOOD PRESSURE: 124 MMHG | TEMPERATURE: 97.8 F | RESPIRATION RATE: 16 BRPM | BODY MASS INDEX: 34.44 KG/M2 | HEIGHT: 60 IN | DIASTOLIC BLOOD PRESSURE: 80 MMHG

## 2023-03-16 DIAGNOSIS — S30.0XXA CONTUSION OF LOWER BACK, INITIAL ENCOUNTER: ICD-10-CM

## 2023-03-16 DIAGNOSIS — S30.0XXA CONTUSION OF COCCYX, INITIAL ENCOUNTER: ICD-10-CM

## 2023-03-16 DIAGNOSIS — R20.0 NUMBNESS OF FINGER: ICD-10-CM

## 2023-03-16 PROCEDURE — 72220 X-RAY EXAM SACRUM TAILBONE: CPT | Mod: TC,FY | Performed by: FAMILY MEDICINE

## 2023-03-16 PROCEDURE — 99214 OFFICE O/P EST MOD 30 MIN: CPT | Performed by: FAMILY MEDICINE

## 2023-03-16 PROCEDURE — 72100 X-RAY EXAM L-S SPINE 2/3 VWS: CPT | Mod: TC,FY | Performed by: FAMILY MEDICINE

## 2023-03-16 RX ORDER — MELOXICAM 7.5 MG/1
7.5 TABLET ORAL DAILY
Qty: 7 TABLET | Refills: 0 | Status: SHIPPED | OUTPATIENT
Start: 2023-03-16

## 2023-03-16 RX ORDER — DEXAMETHASONE 6 MG/1
6 TABLET ORAL DAILY
Qty: 3 TABLET | Refills: 0 | Status: SHIPPED | OUTPATIENT
Start: 2023-03-16 | End: 2023-09-19

## 2023-03-16 ASSESSMENT — ENCOUNTER SYMPTOMS
SENSORY CHANGE: 1
FALLS: 1
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
## 2023-03-20 DIAGNOSIS — S30.0XXA CONTUSION OF COCCYX, INITIAL ENCOUNTER: ICD-10-CM

## 2023-03-20 DIAGNOSIS — R20.0 NUMBNESS OF FINGER: ICD-10-CM

## 2023-03-20 DIAGNOSIS — S30.0XXA CONTUSION OF LOWER BACK, INITIAL ENCOUNTER: ICD-10-CM

## 2023-06-18 RX ORDER — MELOXICAM 7.5 MG/1
7.5 TABLET ORAL DAILY
Qty: 7 TABLET | Refills: 0 | OUTPATIENT
Start: 2023-06-18

## 2023-09-19 ENCOUNTER — OFFICE VISIT (OUTPATIENT)
Dept: URGENT CARE | Facility: PHYSICIAN GROUP | Age: 38
End: 2023-09-19
Payer: MEDICAID

## 2023-09-19 ENCOUNTER — HOSPITAL ENCOUNTER (OUTPATIENT)
Dept: LAB | Facility: MEDICAL CENTER | Age: 38
End: 2023-09-19
Attending: PHYSICAL MEDICINE & REHABILITATION
Payer: MEDICAID

## 2023-09-19 VITALS
SYSTOLIC BLOOD PRESSURE: 110 MMHG | TEMPERATURE: 97.8 F | RESPIRATION RATE: 18 BRPM | WEIGHT: 180.8 LBS | HEART RATE: 57 BPM | HEIGHT: 60 IN | DIASTOLIC BLOOD PRESSURE: 80 MMHG | OXYGEN SATURATION: 97 % | BODY MASS INDEX: 35.5 KG/M2

## 2023-09-19 DIAGNOSIS — J22 LRTI (LOWER RESPIRATORY TRACT INFECTION): ICD-10-CM

## 2023-09-19 LAB
CRP SERPL HS-MCNC: 1.22 MG/DL (ref 0–0.75)
ERYTHROCYTE [SEDIMENTATION RATE] IN BLOOD BY WESTERGREN METHOD: 7 MM/HOUR (ref 0–25)
RHEUMATOID FACT SER IA-ACNC: <10 IU/ML (ref 0–14)

## 2023-09-19 PROCEDURE — 85652 RBC SED RATE AUTOMATED: CPT

## 2023-09-19 PROCEDURE — 86235 NUCLEAR ANTIGEN ANTIBODY: CPT

## 2023-09-19 PROCEDURE — 86431 RHEUMATOID FACTOR QUANT: CPT

## 2023-09-19 PROCEDURE — 86140 C-REACTIVE PROTEIN: CPT

## 2023-09-19 PROCEDURE — 36415 COLL VENOUS BLD VENIPUNCTURE: CPT

## 2023-09-19 PROCEDURE — 86200 CCP ANTIBODY: CPT

## 2023-09-19 PROCEDURE — 3074F SYST BP LT 130 MM HG: CPT | Performed by: NURSE PRACTITIONER

## 2023-09-19 PROCEDURE — 99213 OFFICE O/P EST LOW 20 MIN: CPT | Performed by: NURSE PRACTITIONER

## 2023-09-19 PROCEDURE — 86038 ANTINUCLEAR ANTIBODIES: CPT

## 2023-09-19 PROCEDURE — 86225 DNA ANTIBODY NATIVE: CPT

## 2023-09-19 PROCEDURE — 3079F DIAST BP 80-89 MM HG: CPT | Performed by: NURSE PRACTITIONER

## 2023-09-19 RX ORDER — DOXYCYCLINE HYCLATE 100 MG
100 TABLET ORAL 2 TIMES DAILY
Qty: 14 TABLET | Refills: 0 | Status: SHIPPED | OUTPATIENT
Start: 2023-09-19 | End: 2023-09-26

## 2023-09-19 RX ORDER — AMITRIPTYLINE HYDROCHLORIDE 25 MG/1
TABLET, FILM COATED ORAL
COMMUNITY
Start: 2023-09-13 | End: 2023-10-10

## 2023-09-19 RX ORDER — METHOCARBAMOL 500 MG/1
TABLET, FILM COATED ORAL
COMMUNITY
Start: 2023-09-13

## 2023-09-19 RX ORDER — ALBUTEROL SULFATE 90 UG/1
2 AEROSOL, METERED RESPIRATORY (INHALATION) EVERY 4 HOURS PRN
Qty: 1 EACH | Refills: 0 | Status: SHIPPED | OUTPATIENT
Start: 2023-09-19 | End: 2023-10-03

## 2023-09-19 RX ORDER — MELOXICAM 15 MG/1
TABLET ORAL
COMMUNITY
Start: 2023-09-13 | End: 2023-10-10

## 2023-09-19 RX ORDER — METHYLPREDNISOLONE 4 MG/1
TABLET ORAL
Qty: 21 TABLET | Refills: 0 | Status: SHIPPED | OUTPATIENT
Start: 2023-09-19

## 2023-09-19 ASSESSMENT — ENCOUNTER SYMPTOMS
SPUTUM PRODUCTION: 1
RHINORRHEA: 1
VOMITING: 0
WHEEZING: 1
NAUSEA: 0
ABDOMINAL PAIN: 0
COUGH: 1
FEVER: 1
DIARRHEA: 0
HEMOPTYSIS: 0
SHORTNESS OF BREATH: 1
SORE THROAT: 0

## 2023-09-19 NOTE — LETTER
September 19, 2023    To Whom It May Concern:         This is confirmation that Carlotta Samuel attended her scheduled appointment with GIAN Perales on 9/19/23. Please excuse her absence due to an acute infection. She may return to work on 9/22/2023 or sooner if better.          If you have any questions please do not hesitate to call me at the phone number listed below.    Sincerely,          SYLVIA Perales.  [phone]

## 2023-09-19 NOTE — PROGRESS NOTES
Subjective:     Carlotta Samuel is a 38 y.o. female who presents for Cough (Heavy chest, x1-2 weeks)      Cough  This is a new problem. The current episode started 1 to 4 weeks ago (Carlotta is a pleasant 38 year old female who presents to  today with complaints of URI sympotms X 2 weeks. SHe has new developed chest congestion and cough). The problem has been gradually worsening. The cough is Productive of purulent sputum. Associated symptoms include a fever, rhinorrhea, shortness of breath and wheezing. Pertinent negatives include no hemoptysis or sore throat. Associated symptoms comments: SOB. Treatments tried: Mucinex, Tessalon. Her past medical history is significant for bronchitis and pneumonia.         Review of Systems   Constitutional:  Positive for fever.   HENT:  Positive for congestion and rhinorrhea. Negative for sore throat.    Respiratory:  Positive for cough, sputum production, shortness of breath and wheezing. Negative for hemoptysis.    Gastrointestinal:  Negative for abdominal pain, diarrhea, nausea and vomiting.       PMH: No past medical history on file.  ALLERGIES:   Allergies   Allergen Reactions    Ceclor [Cefaclor] Unspecified     Pt was told when she was a kid    Demerol Unspecified     shaking     SURGHX:   Past Surgical History:   Procedure Laterality Date    PB KNEE SCOPE,AID ANT CRUCIATE REPAIR Left 10/16/2019    Procedure: LEFT KNEE ARTHROSCOPY, RECONSTRUCTION, KNEE, ACL, ARTHROSCOPIC, PARTIAL MENISCECTOMY;  Surgeon: Matthew Grullon M.D.;  Location: SURGERY [facility];  Service: Orthopedics    ABDOMINAL HYSTERECTOMY TOTAL      partial    CHOLECYSTECTOMY      PRIMARY C SECTION      x3    TONSILLECTOMY AND ADENOIDECTOMY       SOCHX:   Social History     Socioeconomic History    Marital status:    Tobacco Use    Smoking status: Never    Smokeless tobacco: Never   Vaping Use    Vaping Use: Never used   Substance and Sexual Activity    Alcohol use: No    Drug use: No     FH: No  family history on file.      Objective:   /80   Pulse (!) 57   Temp 36.6 °C (97.8 °F) (Temporal)   Resp 18   Ht 1.524 m (5')   Wt 82 kg (180 lb 12.8 oz)   SpO2 97%   BMI 35.31 kg/m²     Physical Exam  Vitals and nursing note reviewed.   Constitutional:       General: She is not in acute distress.     Appearance: Normal appearance. She is not ill-appearing.   HENT:      Head: Normocephalic and atraumatic.      Right Ear: External ear normal.      Left Ear: External ear normal.      Nose: No congestion or rhinorrhea.      Mouth/Throat:      Mouth: Mucous membranes are moist.   Eyes:      Extraocular Movements: Extraocular movements intact.      Pupils: Pupils are equal, round, and reactive to light.   Cardiovascular:      Rate and Rhythm: Normal rate and regular rhythm.      Pulses: Normal pulses.      Heart sounds: Normal heart sounds.   Pulmonary:      Effort: Pulmonary effort is normal.      Breath sounds: Examination of the left-upper field reveals decreased breath sounds and wheezing. Examination of the left-middle field reveals decreased breath sounds and wheezing. Examination of the left-lower field reveals decreased breath sounds and wheezing. Decreased breath sounds and wheezing present.   Abdominal:      General: Abdomen is flat. Bowel sounds are normal.      Palpations: Abdomen is soft.      Tenderness: There is abdominal tenderness. There is no right CVA tenderness or left CVA tenderness.   Musculoskeletal:         General: Normal range of motion.      Cervical back: Normal range of motion and neck supple.   Skin:     General: Skin is warm and dry.      Capillary Refill: Capillary refill takes less than 2 seconds.   Neurological:      General: No focal deficit present.      Mental Status: She is alert and oriented to person, place, and time. Mental status is at baseline.   Psychiatric:         Mood and Affect: Mood normal.         Behavior: Behavior normal.         Thought Content: Thought  content normal.         Judgment: Judgment normal.         Assessment/Plan:   Assessment    1. LRTI (lower respiratory tract infection)  doxycycline (VIBRAMYCIN) 100 MG Tab    methylPREDNISolone (MEDROL DOSEPAK) 4 MG Tablet Therapy Pack    albuterol 108 (90 Base) MCG/ACT Aero Soln inhalation aerosol      Carlotta was started on doxycycline, Medrol Dosepak and albuterol inhaler for lower respiratory tract infection.  Lung sounds on the left upper and lower bases are distant with slight wheezing.  I am concerned for start of pneumonia.  Patient to return for worsening or persistent symptoms.  She is in agreement with plan of care today.  Work note provided today.

## 2023-09-21 LAB
CCP IGA+IGG SERPL IA-ACNC: 4 UNITS (ref 0–19)
ENA SCL70 IGG SER QL: 3 AU/ML (ref 0–40)
NUCLEAR IGG SER QL IA: NORMAL

## 2023-09-22 LAB — DSDNA AB TITR SER CLIF: NORMAL {TITER}

## 2023-10-10 ENCOUNTER — APPOINTMENT (OUTPATIENT)
Dept: URGENT CARE | Facility: PHYSICIAN GROUP | Age: 38
End: 2023-10-10
Payer: MEDICAID

## 2023-10-10 ENCOUNTER — OFFICE VISIT (OUTPATIENT)
Dept: URGENT CARE | Facility: PHYSICIAN GROUP | Age: 38
End: 2023-10-10
Payer: MEDICAID

## 2023-10-10 ENCOUNTER — APPOINTMENT (OUTPATIENT)
Dept: RADIOLOGY | Facility: IMAGING CENTER | Age: 38
End: 2023-10-10
Attending: FAMILY MEDICINE
Payer: MEDICAID

## 2023-10-10 VITALS
HEIGHT: 60 IN | WEIGHT: 178 LBS | OXYGEN SATURATION: 97 % | DIASTOLIC BLOOD PRESSURE: 84 MMHG | RESPIRATION RATE: 14 BRPM | SYSTOLIC BLOOD PRESSURE: 106 MMHG | BODY MASS INDEX: 34.95 KG/M2 | TEMPERATURE: 98.4 F | HEART RATE: 61 BPM

## 2023-10-10 DIAGNOSIS — R05.9 COUGH, UNSPECIFIED TYPE: ICD-10-CM

## 2023-10-10 PROCEDURE — 3079F DIAST BP 80-89 MM HG: CPT | Performed by: FAMILY MEDICINE

## 2023-10-10 PROCEDURE — 99214 OFFICE O/P EST MOD 30 MIN: CPT | Performed by: FAMILY MEDICINE

## 2023-10-10 PROCEDURE — 71046 X-RAY EXAM CHEST 2 VIEWS: CPT | Mod: TC,FY | Performed by: FAMILY MEDICINE

## 2023-10-10 PROCEDURE — 3074F SYST BP LT 130 MM HG: CPT | Performed by: FAMILY MEDICINE

## 2023-10-10 RX ORDER — AMITRIPTYLINE HYDROCHLORIDE 25 MG/1
1 TABLET, FILM COATED ORAL
COMMUNITY
End: 2023-10-10

## 2023-10-10 RX ORDER — DOXYCYCLINE HYCLATE 100 MG
1 TABLET ORAL 2 TIMES DAILY
COMMUNITY

## 2023-10-10 RX ORDER — ALBUTEROL SULFATE 90 UG/1
2 AEROSOL, METERED RESPIRATORY (INHALATION) EVERY 4 HOURS PRN
COMMUNITY

## 2023-10-10 RX ORDER — BENZONATATE 200 MG/1
200 CAPSULE ORAL 3 TIMES DAILY PRN
Qty: 45 CAPSULE | Refills: 0 | Status: SHIPPED | OUTPATIENT
Start: 2023-10-10

## 2023-10-10 RX ORDER — MELOXICAM 15 MG/1
1 TABLET ORAL
COMMUNITY
End: 2023-10-10

## 2023-10-10 RX ORDER — METHYLPREDNISOLONE 4 MG/1
TABLET ORAL
Qty: 21 TABLET | Refills: 0 | Status: SHIPPED | OUTPATIENT
Start: 2023-10-10

## 2023-10-10 NOTE — PROGRESS NOTES
CC:  cough        Cough  This is a new problem. The current episode started 2 wks ago. The problem has been unchanged. The problem occurs constantly. The cough is dry.       Associated symptoms include : fatigue, muscle aches, fever. Pertinent negatives include no   headaches, nausea, vomiting, diarrhea, sweats, weight loss or wheezing. Nothing aggravates the symptoms.      Patient was evaluated and started on doxycycline - no improvement.            Home COVID test was negative x 2      There is no history of asthma.        No past medical history on file.      Social History     Tobacco Use    Smoking status: Never    Smokeless tobacco: Never   Vaping Use    Vaping Use: Never used   Substance Use Topics    Alcohol use: No    Drug use: No         Current Outpatient Medications on File Prior to Visit   Medication Sig Dispense Refill    albuterol (VENTOLIN HFA) 108 (90 Base) MCG/ACT Aero Soln inhalation aerosol Inhale 2 Puffs every four hours as needed. (Patient not taking: Reported on 10/10/2023)      doxycycline (VIBRAMYCIN) 100 MG Tab Take 1 Tablet by mouth 2 times a day. (Patient not taking: Reported on 10/10/2023)      diclofenac sodium (VOLTAREN) 1 % Gel APPLY 1-2 GRAMS TO THE AFFECTED AREA(S) BY TOPICAL ROUTE 4 TIMES PER DAY (Patient not taking: Reported on 10/10/2023)      methocarbamol (ROBAXIN) 500 MG Tab Take 1 tablet every 8 hours by oral route as directed for 30 days. (Patient not taking: Reported on 10/10/2023)      methylPREDNISolone (MEDROL DOSEPAK) 4 MG Tablet Therapy Pack Follow schedule on package instructions. (Patient not taking: Reported on 10/10/2023) 21 Tablet 0    meloxicam (MOBIC) 7.5 MG Tab Take 1 Tablet by mouth every day. (Patient not taking: Reported on 9/19/2023) 7 Tablet 0     No current facility-administered medications on file prior to visit.                    Review of Systems   Constitutional: Negative for fever and weight loss.   HENT: negative for otalgia  Cardiovascular -  denies chest pain or dyspnea  Respiratory: Positive for cough.  .  Negative for wheezing.    Neurological: Negative for headaches.   GI - denies nausea, vomiting or diarrhea  Neuro - denies numbness or tingling.            Objective:     /84   Pulse 61   Temp 36.9 °C (98.4 °F)   Resp 14   Ht 1.524 m (5')   Wt 80.7 kg (178 lb)   SpO2 97%     Physical Exam   Constitutional: patient is oriented to person, place, and time. Patient appears well-developed and well-nourished. No distress.   HENT:   Head: Normocephalic and atraumatic.   Right Ear: External ear normal.   Left Ear: External ear normal.   Nose: Mucosal edema  present. Right sinus exhibits no maxillary sinus tenderness. Left sinus exhibits no maxillary sinus tenderness.   Mouth/Throat: Mucous membranes are normal. No oral lesions.  No posterior pharyngeal erythema.  No oropharyngeal exudate or posterior oropharyngeal edema.   Eyes: Conjunctivae and EOM are normal. Pupils are equal, round, and reactive to light. Right eye exhibits no discharge. Left eye exhibits no discharge. No scleral icterus.   Neck: Normal range of motion. Neck supple. No tracheal deviation present.   Cardiovascular: Normal rate, regular rhythm and normal heart sounds.  Exam reveals no friction rub.    Pulmonary/Chest: Effort normal. No respiratory distress. Patient has no wheezes or rhonchi. Patient has no rales.    Musculoskeletal:  exhibits no edema.   Lymphadenopathy:     Patient has no cervical adenopathy.      Neurological: patient is alert and oriented to person, place, and time.   Skin: Skin is warm and dry. No rash noted. No erythema.   Psychiatric: patient  has a normal mood and affect.  behavior is normal.   Nursing note and vitals reviewed.       DX-CHEST-2 VIEWS  Order: [id number]  Status: Final result     Visible to patient: Yes (not seen)     Next appt: None     Dx: Cough, unspecified type     0 Result Notes  Details    Reading Physician Reading Date Result Priority    Marci Beaulieu M.D.  [phone] 10/10/2023 Urgent Care     Narrative & Impression     10/10/2023 1:28 PM     HISTORY/REASON FOR EXAM:  Cough.        TECHNIQUE/EXAM DESCRIPTION AND NUMBER OF VIEWS:  Two views of the chest.     COMPARISON:  None available.     FINDINGS:        The mediastinal and cardiac silhouette is unremarkable.     The pulmonary vascularity is within normal limits.     The lung parenchyma is clear.     There is no significant pleural effusion.     There is no visible pneumothorax.     There are no acute bony abnormalities.     IMPRESSION:     1.  Unremarkable two view chest.           Exam Ended: 10/10/23  1:33 PM Last Resulted: 10/10/23  1:37 PM                Assessment/Plan:       1. Cough        Suspect minor viral illness    Chest x-ray was personally interpreted and reviewed. No acute cardiopulmonary findings. No pulmonary infiltrates or densities. Cardiac silhouette is normal. No hemidiaphragm elevation. No bony abnormalities.     - methylPREDNISolone (MEDROL DOSEPAK) 4 MG Tablet Therapy Pack; Follow schedule on package instructions.  Dispense: 21 Tablet; Refill: 0    - benzonatate (TESSALON) 200 MG capsule; Take 1 Capsule by mouth 3 times a day as needed for Cough.  Dispense: 45 Capsule; Refill: 0     Differential diagnosis, natural history, supportive care, and indications for immediate follow-up discussed. All questions answered. Patient agrees with the plan of care.     Follow-up as needed if symptoms worsen or fail to improve to PCP, Urgent care or Emergency Room.     I have personally reviewed prior external notes and test results pertinent to today's visit.  I have independently reviewed and interpreted all diagnostics ordered during this urgent care acute visit.         My total time spent caring for the patient on the day of the encounter was 30 minutes.   This does not include time spent on separately billable procedures/tests.

## 2023-10-17 ENCOUNTER — OFFICE VISIT (OUTPATIENT)
Dept: URGENT CARE | Facility: PHYSICIAN GROUP | Age: 38
End: 2023-10-17
Payer: MEDICAID

## 2023-10-17 ENCOUNTER — APPOINTMENT (OUTPATIENT)
Dept: RADIOLOGY | Facility: IMAGING CENTER | Age: 38
End: 2023-10-17
Attending: FAMILY MEDICINE
Payer: MEDICAID

## 2023-10-17 VITALS
RESPIRATION RATE: 18 BRPM | HEART RATE: 73 BPM | BODY MASS INDEX: 37.3 KG/M2 | WEIGHT: 190 LBS | OXYGEN SATURATION: 95 % | HEIGHT: 60 IN | DIASTOLIC BLOOD PRESSURE: 82 MMHG | TEMPERATURE: 97.4 F | SYSTOLIC BLOOD PRESSURE: 128 MMHG

## 2023-10-17 DIAGNOSIS — R05.1 ACUTE COUGH: ICD-10-CM

## 2023-10-17 DIAGNOSIS — J06.9 VIRAL URI WITH COUGH: ICD-10-CM

## 2023-10-17 PROCEDURE — 99213 OFFICE O/P EST LOW 20 MIN: CPT | Performed by: FAMILY MEDICINE

## 2023-10-17 PROCEDURE — 3074F SYST BP LT 130 MM HG: CPT | Performed by: FAMILY MEDICINE

## 2023-10-17 PROCEDURE — 3079F DIAST BP 80-89 MM HG: CPT | Performed by: FAMILY MEDICINE

## 2023-10-17 RX ORDER — FLUTICASONE PROPIONATE 50 MCG
1 SPRAY, SUSPENSION (ML) NASAL 2 TIMES DAILY
Qty: 16 G | Refills: 0 | Status: SHIPPED | OUTPATIENT
Start: 2023-10-17 | End: 2023-10-24

## 2023-10-17 RX ORDER — METHYLPREDNISOLONE 4 MG/1
TABLET ORAL
Qty: 21 TABLET | Refills: 0 | Status: SHIPPED | OUTPATIENT
Start: 2023-10-17

## 2023-10-18 NOTE — PROGRESS NOTES
CC:  cough        Cough  This is a new problem. The current episode started 2 days ago. The problem has been unchanged. The problem occurs constantly. The cough is productive.       Pt denies : fatigue, muscle aches, fever. Pertinent negatives include no   headaches, nausea, vomiting, diarrhea, sweats, weight loss or wheezing. Nothing aggravates the symptoms.  Patient has tried nothing for the symptoms. There is no history of asthma.        No past medical history on file.      Social History     Tobacco Use    Smoking status: Never    Smokeless tobacco: Never   Vaping Use    Vaping Use: Never used   Substance Use Topics    Alcohol use: No    Drug use: No         Current Outpatient Medications on File Prior to Visit   Medication Sig Dispense Refill    albuterol (VENTOLIN HFA) 108 (90 Base) MCG/ACT Aero Soln inhalation aerosol Inhale 2 Puffs every four hours as needed. (Patient not taking: Reported on 10/10/2023)      doxycycline (VIBRAMYCIN) 100 MG Tab Take 1 Tablet by mouth 2 times a day. (Patient not taking: Reported on 10/10/2023)      methylPREDNISolone (MEDROL DOSEPAK) 4 MG Tablet Therapy Pack Follow schedule on package instructions. (Patient not taking: Reported on 10/17/2023) 21 Tablet 0    benzonatate (TESSALON) 200 MG capsule Take 1 Capsule by mouth 3 times a day as needed for Cough. (Patient not taking: Reported on 10/17/2023) 45 Capsule 0    diclofenac sodium (VOLTAREN) 1 % Gel APPLY 1-2 GRAMS TO THE AFFECTED AREA(S) BY TOPICAL ROUTE 4 TIMES PER DAY (Patient not taking: Reported on 10/10/2023)      methocarbamol (ROBAXIN) 500 MG Tab Take 1 tablet every 8 hours by oral route as directed for 30 days. (Patient not taking: Reported on 10/10/2023)      methylPREDNISolone (MEDROL DOSEPAK) 4 MG Tablet Therapy Pack Follow schedule on package instructions. (Patient not taking: Reported on 10/10/2023) 21 Tablet 0    meloxicam (MOBIC) 7.5 MG Tab Take 1 Tablet by mouth every day. (Patient not taking: Reported on  9/19/2023) 7 Tablet 0     No current facility-administered medications on file prior to visit.                    Review of Systems    HENT: negative for otalgia  Cardiovascular - denies chest pain or dyspnea  Respiratory: Positive for cough.  .  Negative for wheezing.    Neurological: Negative for headaches.   GI - denies nausea, vomiting or diarrhea  Neuro - denies numbness or tingling.            Objective:     /82   Pulse 73   Temp 36.3 °C (97.4 °F) (Temporal)   Resp 18   Ht 1.524 m (5')   Wt 86.2 kg (190 lb)   SpO2 95%     Physical Exam   Constitutional: patient is oriented to person, place, and time. Patient appears well-developed and well-nourished. No distress.   HENT:   Head: Normocephalic and atraumatic.   Right Ear: External ear normal.   Left Ear: External ear normal.   Nose: Mucosal edema  present. Right sinus exhibits no maxillary sinus tenderness. Left sinus exhibits no maxillary sinus tenderness.   Mouth/Throat: Mucous membranes are normal. No oral lesions.  No posterior pharyngeal erythema.  No oropharyngeal exudate or posterior oropharyngeal edema.   Eyes: Conjunctivae and EOM are normal. Pupils are equal, round, and reactive to light. Right eye exhibits no discharge. Left eye exhibits no discharge. No scleral icterus.   Neck: Normal range of motion. Neck supple. No tracheal deviation present.   Cardiovascular: Normal rate, regular rhythm and normal heart sounds.  Exam reveals no friction rub.    Pulmonary/Chest: Effort normal. No respiratory distress. Patient has no wheezes or rhonchi. Patient has no rales.    Musculoskeletal:  exhibits no edema.   Lymphadenopathy:     Patient has no cervical adenopathy.      Neurological: patient is alert and oriented to person, place, and time.   Skin: Skin is warm and dry. No rash noted. No erythema.   Psychiatric: patient  has a normal mood and affect.  behavior is normal.   Nursing note and vitals reviewed.              Assessment/Plan:       1.  Viral URI with cough           - fluticasone (FLONASE) 50 MCG/ACT nasal spray; Administer 1 Spray into affected nostril(S) 2 times a day for 7 days.  Dispense: 16 g; Refill: 0  - methylPREDNISolone (MEDROL DOSEPAK) 4 MG Tablet Therapy Pack; Follow schedule on package instructions.  Dispense: 21 Tablet; Refill: 0      #2.  Hx hayfever    - Referral to Allergy

## 2024-01-03 ENCOUNTER — HOSPITAL ENCOUNTER (OUTPATIENT)
Facility: MEDICAL CENTER | Age: 39
End: 2024-01-03
Attending: NURSE PRACTITIONER
Payer: COMMERCIAL

## 2024-01-03 ENCOUNTER — EH NON-PROVIDER (OUTPATIENT)
Dept: OCCUPATIONAL MEDICINE | Facility: CLINIC | Age: 39
End: 2024-01-03

## 2024-01-03 DIAGNOSIS — Z02.89 ENCOUNTER FOR OCCUPATIONAL HEALTH ASSESSMENT: ICD-10-CM

## 2024-01-03 PROCEDURE — 90715 TDAP VACCINE 7 YRS/> IM: CPT | Performed by: PREVENTIVE MEDICINE

## 2024-01-03 PROCEDURE — 86735 MUMPS ANTIBODY: CPT | Performed by: NURSE PRACTITIONER

## 2024-01-03 PROCEDURE — 86787 VARICELLA-ZOSTER ANTIBODY: CPT | Performed by: NURSE PRACTITIONER

## 2024-01-03 PROCEDURE — 86765 RUBEOLA ANTIBODY: CPT | Performed by: NURSE PRACTITIONER

## 2024-01-03 PROCEDURE — 86762 RUBELLA ANTIBODY: CPT | Performed by: NURSE PRACTITIONER

## 2024-01-03 PROCEDURE — 86480 TB TEST CELL IMMUN MEASURE: CPT | Performed by: NURSE PRACTITIONER

## 2024-01-03 PROCEDURE — 90686 IIV4 VACC NO PRSV 0.5 ML IM: CPT | Performed by: PREVENTIVE MEDICINE

## 2024-01-04 LAB
GAMMA INTERFERON BACKGROUND BLD IA-ACNC: 0.07 IU/ML
M TB IFN-G BLD-IMP: NEGATIVE
M TB IFN-G CD4+ BCKGRND COR BLD-ACNC: 0.09 IU/ML
MITOGEN IGNF BCKGRD COR BLD-ACNC: >10 IU/ML
QFT TB2 - NIL TBQ2: 0.05 IU/ML

## 2024-01-05 LAB
MEV IGG SER-ACNC: 90.2 AU/ML
MUV IGG SER IA-ACNC: 52.8 AU/ML
RUBV AB SER QL: 165 IU/ML
VZV IGG SER IA-ACNC: 1088 IV

## 2024-02-29 DIAGNOSIS — J06.9 VIRAL URI WITH COUGH: ICD-10-CM

## 2024-05-12 ENCOUNTER — OFFICE VISIT (OUTPATIENT)
Dept: URGENT CARE | Facility: PHYSICIAN GROUP | Age: 39
End: 2024-05-12
Payer: MEDICAID

## 2024-05-12 VITALS
SYSTOLIC BLOOD PRESSURE: 110 MMHG | HEART RATE: 54 BPM | TEMPERATURE: 97.6 F | BODY MASS INDEX: 37.69 KG/M2 | OXYGEN SATURATION: 98 % | RESPIRATION RATE: 16 BRPM | DIASTOLIC BLOOD PRESSURE: 80 MMHG | WEIGHT: 192 LBS | HEIGHT: 60 IN

## 2024-05-12 DIAGNOSIS — R05.1 ACUTE COUGH: ICD-10-CM

## 2024-05-12 DIAGNOSIS — J02.9 ACUTE PHARYNGITIS, UNSPECIFIED ETIOLOGY: ICD-10-CM

## 2024-05-12 LAB
FLUAV RNA SPEC QL NAA+PROBE: NEGATIVE
FLUBV RNA SPEC QL NAA+PROBE: NEGATIVE
RSV RNA SPEC QL NAA+PROBE: NEGATIVE
S PYO DNA SPEC NAA+PROBE: NOT DETECTED
SARS-COV-2 RNA RESP QL NAA+PROBE: NEGATIVE

## 2024-05-12 PROCEDURE — 87651 STREP A DNA AMP PROBE: CPT | Performed by: FAMILY MEDICINE

## 2024-05-12 PROCEDURE — 3079F DIAST BP 80-89 MM HG: CPT | Performed by: FAMILY MEDICINE

## 2024-05-12 PROCEDURE — 87637 SARSCOV2&INF A&B&RSV AMP PRB: CPT | Mod: QW | Performed by: FAMILY MEDICINE

## 2024-05-12 PROCEDURE — 3074F SYST BP LT 130 MM HG: CPT | Performed by: FAMILY MEDICINE

## 2024-05-12 PROCEDURE — 99214 OFFICE O/P EST MOD 30 MIN: CPT | Performed by: FAMILY MEDICINE

## 2024-05-12 RX ORDER — PREDNISONE 20 MG/1
TABLET ORAL
Qty: 7 TABLET | Refills: 0 | Status: SHIPPED | OUTPATIENT
Start: 2024-05-12

## 2024-05-12 NOTE — PROGRESS NOTES
Chief Complaint:    Chief Complaint   Patient presents with    Pharyngitis     Poss strep sx 2 days. Needs note for work.        History of Present Illness:    Sore throat x 2 days. Also tender right neck gland. Some intermittent nasal symptoms. Coughs more due to throat irritation. Unknown if she has had fever.      Past Medical History:    History reviewed. No pertinent past medical history.    Past Surgical History:    Past Surgical History:   Procedure Laterality Date    PB KNEE SCOPE,AID ANT CRUCIATE REPAIR Left 10/16/2019    Procedure: LEFT KNEE ARTHROSCOPY, RECONSTRUCTION, KNEE, ACL, ARTHROSCOPIC, PARTIAL MENISCECTOMY;  Surgeon: Matthew Grullon M.D.;  Location: SURGERY [facility];  Service: Orthopedics    ABDOMINAL HYSTERECTOMY TOTAL      partial    CHOLECYSTECTOMY      PRIMARY C SECTION      x3    TONSILLECTOMY AND ADENOIDECTOMY       Social History:    Social History     Socioeconomic History    Marital status:      Spouse name: Not on file    Number of children: Not on file    Years of education: Not on file    Highest education level: Not on file   Occupational History    Not on file   Tobacco Use    Smoking status: Never    Smokeless tobacco: Never   Vaping Use    Vaping Use: Never used   Substance and Sexual Activity    Alcohol use: No    Drug use: No    Sexual activity: Not on file   Other Topics Concern    Not on file   Social History Narrative    Not on file     Social Determinants of Health     Financial Resource Strain: Not on file   Food Insecurity: Not on file   Transportation Needs: Not on file   Physical Activity: Not on file   Stress: Not on file   Social Connections: Not on file   Intimate Partner Violence: Not on file   Housing Stability: Not on file     Family History:    History reviewed. No pertinent family history.    Medications:    No current outpatient medications on file prior to visit.     No current facility-administered medications on file prior to visit.      Allergies:    Allergies   Allergen Reactions    Ceclor [Cefaclor] Unspecified     Pt was told when she was a kid    Demerol Unspecified     shaking       Vitals:    Vitals:    24 1325   BP: 110/80   Pulse: (!) 54   Resp: 16   Temp: 36.4 °C (97.6 °F)   TempSrc: Temporal   SpO2: 98%   Weight: 87.1 kg (192 lb)   Height: 1.524 m (5')       Physical Exam:    Constitutional: Vital signs reviewed. Appears well-developed and well-nourished. No acute distress.   Eyes: Sclera white, conjunctivae clear.   ENT: External ears normal. Hearing normal. Lips/teeth are normal. Oral mucosa pink and moist. Posterior pharynx: mild irritated appearance.  Neck: Neck supple.   Pulmonary/Chest: Respirations non-labored.   Lymph: Right anterior cervical node is tender to palpation.  Musculoskeletal: Normal gait. No muscular atrophy or weakness.  Neurological: Alert and oriented to person, place, and time. Muscle tone normal. Coordination normal.   Skin: No rashes or lesions. Warm, dry, normal turgor.  Psychiatric: Normal mood and affect. Behavior is normal. Judgment and thought content normal.       Diagnostics:    Cepheid PCR test for Strep A is negative.     Cepheid PCR test for Covid, Flu, and RSV is negative.       Assessment / Plan & Medical Decision Makin. Acute pharyngitis, unspecified etiology  - POCT CEPHEID GROUP A STREP - PCR  - predniSONE (DELTASONE) 20 MG Tab; 1 TAB BY MOUTH ONCE A DAY ONLY IF NEEDED FOR SORE THROAT OR TENDER NECK GLANDS TO HELP INFLAMMATION. TAKE WITH FOOD.  Dispense: 7 Tablet; Refill: 0    2. Acute cough  - POCT CoV-2, Flu A/B, RSV by PCR       Discussed with her DDX, management options, and risks, benefits, and alternatives to treatment plan agreed upon.    Sore throat x 2 days. Also tender right neck gland. Some intermittent nasal symptoms. Coughs more due to throat irritation. Unknown if she has had fever.    Posterior pharynx: mild irritated appearance. Right anterior cervical node is  tender to palpation.    Cepheid PCR test for Strep A is negative.     Cepheid PCR test for Covid, Flu, and RSV is negative.     Recommended treat symptoms with medication(s) as needed, otherwise further observation and see if symptoms will self-resolve as likely viral etiology at this time.    Agreeable to medication prescribed for symptomatic treatment.    Discussed expected course of duration, time for improvement, and to seek follow-up in Emergency Room, urgent care, or with PCP if getting worse at any time or not improving within expected time frame.

## 2024-05-29 RX ORDER — FLUTICASONE PROPIONATE 50 MCG
SPRAY, SUSPENSION (ML) NASAL
Qty: 48 G | OUTPATIENT
Start: 2024-05-29

## 2024-12-29 ENCOUNTER — OFFICE VISIT (OUTPATIENT)
Dept: URGENT CARE | Facility: PHYSICIAN GROUP | Age: 39
End: 2024-12-29
Payer: MEDICAID

## 2024-12-29 VITALS
HEART RATE: 77 BPM | SYSTOLIC BLOOD PRESSURE: 120 MMHG | DIASTOLIC BLOOD PRESSURE: 70 MMHG | TEMPERATURE: 98.3 F | BODY MASS INDEX: 37.3 KG/M2 | RESPIRATION RATE: 18 BRPM | OXYGEN SATURATION: 97 % | HEIGHT: 60 IN | WEIGHT: 190 LBS

## 2024-12-29 DIAGNOSIS — H66.001 NON-RECURRENT ACUTE SUPPURATIVE OTITIS MEDIA OF RIGHT EAR WITHOUT SPONTANEOUS RUPTURE OF TYMPANIC MEMBRANE: ICD-10-CM

## 2024-12-29 DIAGNOSIS — R05.1 ACUTE COUGH: ICD-10-CM

## 2024-12-29 RX ORDER — DEXTROMETHORPHAN HYDROBROMIDE AND PROMETHAZINE HYDROCHLORIDE 15; 6.25 MG/5ML; MG/5ML
5 SYRUP ORAL EVERY 6 HOURS PRN
Qty: 100 ML | Refills: 0 | Status: SHIPPED | OUTPATIENT
Start: 2024-12-29

## 2024-12-29 RX ORDER — LOSARTAN POTASSIUM 100 MG/1
100 TABLET ORAL DAILY
COMMUNITY

## 2024-12-29 RX ORDER — AZITHROMYCIN 250 MG/1
TABLET, FILM COATED ORAL
Qty: 6 TABLET | Refills: 0 | Status: SHIPPED | OUTPATIENT
Start: 2024-12-29

## 2024-12-29 ASSESSMENT — ENCOUNTER SYMPTOMS
FEVER: 0
COUGH: 1
HEADACHES: 1
SHORTNESS OF BREATH: 0
WHEEZING: 0
SORE THROAT: 1

## 2024-12-29 ASSESSMENT — COPD QUESTIONNAIRES: COPD: 0

## 2024-12-29 NOTE — PROGRESS NOTES
Subjective     Carlotta Samuel is a 39 y.o. female who presents with Cough and Otalgia (Sx 1 wk )            Cough  This is a new problem. The problem has been gradually worsening. The problem occurs constantly. The cough is Non-productive. Associated symptoms include ear congestion, ear pain, headaches, nasal congestion and a sore throat. Pertinent negatives include no chest pain, chills, eye redness, fever, hemoptysis, rash, shortness of breath or wheezing. She has tried prescription cough suppressant (OTC cough syrup) for the symptoms. The treatment provided no relief. Her past medical history is significant for bronchitis and pneumonia. There is no history of asthma or COPD.       Review of Systems   Constitutional:  Negative for chills and fever.   HENT:  Positive for congestion, ear pain and sore throat. Negative for ear discharge and sinus pain.    Eyes:  Negative for redness.   Respiratory:  Positive for cough. Negative for hemoptysis, sputum production, shortness of breath and wheezing.    Cardiovascular:  Negative for chest pain.   Gastrointestinal:  Negative for diarrhea, nausea and vomiting.   Skin:  Negative for rash.   Neurological:  Positive for headaches.              Objective     /70   Pulse 77   Temp 36.8 °C (98.3 °F) (Temporal)   Resp 18   Ht 1.524 m (5')   Wt 86.2 kg (190 lb)   SpO2 97%   BMI 37.11 kg/m²      Physical Exam  Constitutional:       Appearance: Normal appearance.   HENT:      Head: Normocephalic and atraumatic.      Right Ear: External ear normal. Decreased hearing noted. A middle ear effusion is present. There is hemotympanum. Tympanic membrane is erythematous and bulging. Tympanic membrane is not perforated.      Left Ear: External ear normal. There is hemotympanum. Tympanic membrane is not erythematous.      Nose: Congestion and rhinorrhea present.   Eyes:      Extraocular Movements: Extraocular movements intact.      Conjunctiva/sclera: Conjunctivae normal.       Pupils: Pupils are equal, round, and reactive to light.   Pulmonary:      Effort: Pulmonary effort is normal. No respiratory distress.      Breath sounds: Normal breath sounds. No stridor. No wheezing, rhonchi or rales.   Abdominal:      General: Abdomen is flat.      Palpations: Abdomen is soft.   Musculoskeletal:         General: Normal range of motion.      Cervical back: Normal range of motion and neck supple.   Lymphadenopathy:      Cervical: No cervical adenopathy.   Skin:     General: Skin is warm and dry.   Neurological:      Mental Status: She is alert.             Assessment & Plan     This is an acute condition.  Carlotta is a very pleasant 39-year-old female presenting to clinic today with complaints of cough, ear pain, nasal congestion, and headache for one week. Reports cough keeps her up at night.  She denies fevers, chest congestion, wheezing, chest pain, or shortness of breath.  She has tried OTC cough syrup without relief.  Patient denies trauma to ear, frequent ear infections, or swimming.  Denies pmhx of asthma, bronchitis, COPD, or emphysema.  Denies recent travel or smoking.  Reports kids have been sick with Uri symptoms at home.   Assessment & Plan  Non-recurrent acute suppurative otitis media of right ear without spontaneous rupture of tympanic membrane    Orders:    azithromycin (ZITHROMAX) 250 MG Tab; Take 2 Tablets by mouth every day AND 1 Tablet every day.    PMHX, medications, and VS reviewed.  Patient is not acutely ill appearing.  Lung sounds clear, no wheezing or rhonchi.  Cough dry, non productive. Bilateral sinuses nontender upon palpation.  Bilateral ear TMS erythematous, , right TM bulging with mild mid ear effusion, without perforation.  Decreased hearing worse right than left.  Hemotympanum noted in bilateral TM.  External ear and mastoid processes without deformity.   Will treat for acute otitis media with Azithromycin for 5 days.    Cough likely related to acute bacterial otitis  media, but Azithromycin will offer respiratory coverage if lung component of infection. However, likely viral cough.  Encouraged OTC decongestant for daytime relief.  Will prescribe Promethazine DM for cough prn HS.  Offered antiviral testing and medication, but given multi-symptom presentation and duration, antiviral medication are not recommended treatment options.    Encouraged to continue OTC supportive meds PRN. Humidification, increased fluids intake, adding electrolytes to diet, gargling salt water, drinking warm liquids with honey, OTC throat spray, and rest also discussed.   Discussed side effects and interactions of OTC meds and any prescribed.  Patient instructed to return to clinic if symptoms do not improve or worsen within 5-7 days.  Also informed that if chest pain or increased shortness of breath develop to immediately follow up for evaluation in the ED. Advised of risks of not doing so.    The patient demonstrated a good understanding and agreed with the treatment plan. Denies further questions.     Acute cough    Orders:    promethazine-dextromethorphan (PROMETHAZINE-DM) 6.25-15 MG/5ML syrup; Take 5 mL by mouth every 6 hours as needed for Cough for up to 20 doses.

## 2024-12-29 NOTE — LETTER
[facility] [address] [address]     December 29, 2024    Patient: Carlotta Samuel   YOB: 1985   Date of Visit: 12/29/2024       To Whom It May Concern:    Carlotta Samuel was seen and treated in our department on 12/29/2024. Please excuse absence from work due to illness starting 12/29/24 through 12/31/24.    Sincerely,     SYLVIA Lugo.

## 2024-12-30 ASSESSMENT — ENCOUNTER SYMPTOMS
EYE REDNESS: 0
CHILLS: 0
HEMOPTYSIS: 0
NAUSEA: 0
VOMITING: 0
SINUS PAIN: 0
DIARRHEA: 0
SPUTUM PRODUCTION: 0

## 2025-02-22 NOTE — PROGRESS NOTES
Chief Complaint   Patient presents with   • Eye Problem     RT eye x 2 days , Redness ,        HISTORY OF PRESENT ILLNESS: Patient is a 35 y.o. female who presents today for the following:    Patient comes in for evaluation of right eye redness and irritation that started 2 days ago.  Started after watching a race at the [employer] in [place].  She only has irritation in the right eye.  She complains of burning and itching.  Her eyelids have been irritated as well.  She complains of excessive tearing, make it difficult to see at times.  It is light sensitive.  She does not wear contact lenses.    Patient Active Problem List    Diagnosis Date Noted   • Abdominal pain 02/15/2021   • Cyst of ovary 02/15/2021   • Indigestion 02/15/2021   • Unintended weight loss 02/15/2021   • Sprain of unspecified cruciate ligament of unspecified knee, initial encounter 10/16/2019       Allergies:Ceclor [cefaclor] and Demerol    Current Outpatient Medications Ordered in Epic   Medication Sig Dispense Refill   • erythromycin 5 MG/GM Ointment Apply 1 Application to right eye 3 times a day for 7 days. 3.5 g 0   • methylPREDNISolone (MEDROL DOSEPAK) 4 MG Tablet Therapy Pack Use as package directs 21 tablet 0   • losartan (COZAAR) 50 MG Tab TAKE 1 TABLET BY MOUTH DAILY       No current [facility]-ordered facility-administered medications on file.       History reviewed. No pertinent past medical history.    Social History     Tobacco Use   • Smoking status: Never Smoker   • Smokeless tobacco: Never Used   Substance Use Topics   • Alcohol use: No   • Drug use: No       No family status information on file.   History reviewed. No pertinent family history.    Review of Systems:   Constitutional ROS: No unexpected change in weight  Pulmonary ROS: No chronic cough, sputum, or hemoptysis, No dyspnea on exertion, No wheezing  Cardiovascular ROS: No diaphoresis, No edema, No palpitations  Hematologic/Lymphatic ROS: No chills, No night sweats, No weight  loss  Skin/Integumentary ROS: No edema, No evidence of rash, No itching    Exam:  /72   Pulse 88   Temp 36.7 °C (98.1 °F) (Temporal)   Resp 12   Ht 1.524 m (5')   Wt 75.3 kg (166 lb)   SpO2 98%   General: Well developed, well nourished. No distress.    Eye: PERRL/EOMI; conjunctival injection noted in the right eye only with excessive tearing.  Lids are normal.  No rashes noted anywhere on the face, specifically around the right eye.  No foreign body or abnormalities noted on eyelid eversion.  No fluorescein uptake noted on exam.  ENMT: Lips without lesions, MMM. Oropharynx is clear. Bilateral TMs are within normal limits.  Pulmonary: Unlabored respiratory effort. Lungs clear to auscultation, no wheezes, no rhonchi.    Cardiovascular: Regular rate and rhythm without murmur.   Neurologic: Grossly nonfocal. No facial asymmetry noted.  Lymph: No cervical lymphadenopathy noted.  Skin: Warm, dry, good turgor. No rashes in visible areas.   Psych: Normal mood. Alert and oriented to person, place and time.    Assessment/Plan:  Unknown etiology.  Erythromycin ointment provided for comfort.  Use all medication as directed.  Follow up with optometry for worsening or persistent symptoms.  1. Conjunctivitis of right eye, unspecified conjunctivitis type  erythromycin 5 MG/GM Ointment    methylPREDNISolone (MEDROL DOSEPAK) 4 MG Tablet Therapy Pack        Universal Safety Interventions